# Patient Record
Sex: FEMALE | Race: WHITE | NOT HISPANIC OR LATINO | Employment: UNEMPLOYED | URBAN - METROPOLITAN AREA
[De-identification: names, ages, dates, MRNs, and addresses within clinical notes are randomized per-mention and may not be internally consistent; named-entity substitution may affect disease eponyms.]

---

## 2017-10-15 ENCOUNTER — HOSPITAL ENCOUNTER (EMERGENCY)
Facility: CLINIC | Age: 37
Discharge: HOME | End: 2017-10-15
Attending: EMERGENCY MEDICINE | Admitting: EMERGENCY MEDICINE
Payer: COMMERCIAL

## 2017-10-15 ENCOUNTER — APPOINTMENT (OUTPATIENT)
Dept: GENERAL RADIOLOGY | Facility: CLINIC | Age: 37
End: 2017-10-15
Attending: EMERGENCY MEDICINE
Payer: COMMERCIAL

## 2017-10-15 VITALS
HEART RATE: 78 BPM | BODY MASS INDEX: 24.91 KG/M2 | WEIGHT: 155 LBS | RESPIRATION RATE: 16 BRPM | TEMPERATURE: 97.7 F | DIASTOLIC BLOOD PRESSURE: 90 MMHG | HEIGHT: 66 IN | OXYGEN SATURATION: 98 % | SYSTOLIC BLOOD PRESSURE: 119 MMHG

## 2017-10-15 DIAGNOSIS — R09.A2 GLOBUS SENSATION: ICD-10-CM

## 2017-10-15 PROCEDURE — 25000125 ZZHC RX 250: Performed by: EMERGENCY MEDICINE

## 2017-10-15 PROCEDURE — 99284 EMERGENCY DEPT VISIT MOD MDM: CPT | Mod: 25 | Performed by: EMERGENCY MEDICINE

## 2017-10-15 PROCEDURE — 92511 NASOPHARYNGOSCOPY: CPT | Performed by: EMERGENCY MEDICINE

## 2017-10-15 PROCEDURE — 25000132 ZZH RX MED GY IP 250 OP 250 PS 637: Performed by: EMERGENCY MEDICINE

## 2017-10-15 PROCEDURE — 99285 EMERGENCY DEPT VISIT HI MDM: CPT | Mod: 25 | Performed by: EMERGENCY MEDICINE

## 2017-10-15 PROCEDURE — 70360 X-RAY EXAM OF NECK: CPT

## 2017-10-15 PROCEDURE — 92511 NASOPHARYNGOSCOPY: CPT | Mod: Z6 | Performed by: EMERGENCY MEDICINE

## 2017-10-15 PROCEDURE — 71020 XR CHEST 2 VW: CPT

## 2017-10-15 RX ORDER — LIDOCAINE HYDROCHLORIDE 40 MG/ML
50 SOLUTION TOPICAL ONCE
Status: COMPLETED | OUTPATIENT
Start: 2017-10-15 | End: 2017-10-15

## 2017-10-15 RX ORDER — OXYMETAZOLINE HYDROCHLORIDE 0.05 G/100ML
2 SPRAY NASAL 2 TIMES DAILY
Qty: 30 ML | Refills: 0 | Status: SHIPPED | OUTPATIENT
Start: 2017-10-15 | End: 2017-10-18

## 2017-10-15 RX ORDER — OXYMETAZOLINE HYDROCHLORIDE 0.05 G/100ML
2 SPRAY NASAL 2 TIMES DAILY
Status: DISCONTINUED | OUTPATIENT
Start: 2017-10-15 | End: 2017-10-15 | Stop reason: HOSPADM

## 2017-10-15 RX ADMIN — LIDOCAINE HYDROCHLORIDE 30 ML: 20 SOLUTION ORAL; TOPICAL at 08:32

## 2017-10-15 RX ADMIN — LIDOCAINE HYDROCHLORIDE 50 ML: 40 SOLUTION TOPICAL at 08:15

## 2017-10-15 RX ADMIN — OXYMETAZOLINE HYDROCHLORIDE 2 SPRAY: 5 SPRAY NASAL at 08:15

## 2017-10-15 ASSESSMENT — ENCOUNTER SYMPTOMS
COUGH: 0
SORE THROAT: 1
VOICE CHANGE: 0
TROUBLE SWALLOWING: 0

## 2017-10-15 NOTE — ED NOTES
"Pt ate some peanuts on Thursday, feel as if there is still one stuck in her throat.  Pt stated she \"always has problems when eating peanuts.\"  "

## 2017-10-15 NOTE — ED AVS SNAPSHOT
[facility], Emergency Department    [address]    [facility] [address]    Phone:  [phone]    Fax:  [phone]                                       Charity Duarte   MRN: [id number]    Department:  [facility], Emergency Department   Date of Visit:  10/15/2017           After Visit Summary Signature Page     I have received my discharge instructions, and my questions have been answered. I have discussed any challenges I see with this plan with the nurse or doctor.    ..........................................................................................................................................  Patient/Patient Representative Signature      ..........................................................................................................................................  Patient Representative Print Name and Relationship to Patient    ..................................................               ................................................  Date                                            Time    ..........................................................................................................................................  Reviewed by Signature/Title    ...................................................              ..............................................  Date                                                            Time

## 2017-10-15 NOTE — ED PROVIDER NOTES
"  History     Chief Complaint   Patient presents with     Swallowed Foreign Body     Pt feels as if she has  \"a peanut stuck in throat since Thursday night.\"     HPI  Charity Duarte is a 37 year old female who feels discomfort in her throat. 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 She states that previously she has had the sensation of a nut getting stuck in her throat, but has always passed it eventually. She has no voice changes, she is tolerating PO solids, liquids, and secretions, and no respiratory symptoms. She denies coughing or choking on the nut or food at the time.       This part of the document was transcribed by Ruby Galan Medical Scribe.   I have reviewed the Medications, Allergies, Past Medical and Surgical History, and Social History in the [facility] system.  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     ENT SURGERY  2009    wisdom tooth extraction       Family History   Problem Relation Age of Onset     DIABETES Maternal Grandmother      DIABETES Paternal Grandmother      Eye Disorder Maternal Grandmother      glaucoma     Family History Negative No family hx of      no breast, colon, uterine, ovarian     Hypertension Mother      DIABETES Mother      Psychotic Disorder Mother      Arthritis Mother      Arthritis Father      Hypertension Brother      DIABETES Brother        Social History   Substance Use Topics     Smoking status: Current Every Day Smoker     Packs/day: 1.00     Smokeless tobacco: Never Used      Comment: amount varies to stress     Alcohol use Yes      Comment: rare       Current Facility-Administered Medications   Medication     oxymetazoline (AFRIN) 0.05 % spray 2 " "spray     Current Outpatient Prescriptions   Medication     Sertraline HCl (ZOLOFT PO)     BACLOFEN PO     Citalopram Hydrobromide (CELEXA PO)     ranitidine (ZANTAC) 150 MG tablet     oxymetazoline (AFRIN) 0.05 % spray     norethindrone-ethinyl estradiol (MICROGESTIN 1.5/30) 1.5-30 MG-MCG TABS     norethindrone-ethinyl estradiol (MICROGESTIN 1/20) 1-20 MG-MCG per tablet     LORAZEPAM 1 MG OR TABS        Allergies   Allergen Reactions     Bees      Latex      Marijuana [Cannabis]      Pollen Extract        Review of Systems   HENT: Positive for sore throat. Negative for trouble swallowing and voice change.    Respiratory: Negative for cough.    All other systems reviewed and are negative.      Physical Exam   BP: 117/82  Pulse: 86  Heart Rate: 81  Temp: 97.8  F (36.6  C)  Resp: 16  Height: 167.6 cm (5' 6\")  Weight: 70.3 kg (155 lb)  SpO2: 98 %       Physical Exam  Gen:A&Ox3, no acute distress  HEENT:PERRL, no facial tenderness or wounds, head atraumatic. Oropharynx without tonsil enlargement or exudates, mucous membranes moist, TMs clear bilaterally  Neck:no bony tenderness, trachea midline, no masses, no crepitus.   CV:RRR without murmurs  PULM:Clear to auscultation bilaterally  Abd:soft, nontender, nondistended. Bowel sounds present and normal  UE:No traumatic injuries, skin normal  LE:no traumatic injuries, skin normal  Neuro:CN II-XII intact, strength 5/5 throughout, gait stable.         ED Course     ED Course     Procedures          Critical Care time:  none    Assessments & Plan (with Medical Decision Making)   37 year old female presenting with global sensation. She has stable vitals, work of breathing, respiratory rate, and speech all within normal limits and she is tolerating secretions without difficulty. X-rays of the chest and soft tissues of the neck were performed and showed no abnormalities to the epiglottis, soft tissues of the neck, or radiopaque foreign bodies. I attempted to perform a " nasopharyngoscopy after anesthesia with topical 4% lidocaine to the nares, oxymetazoline, and hurricane spray to the throat. I was able to visualized the upper oropharynx and the epiglottis, were all appearing normal, but she did not tolerate deeper evaluation to see the retinoid cords. I will have her follow up in the [facility] this week for further evaluation. She was given a GI cocktail to help decrease throat discomfort symptoms and we will have her use ranitidine BID for 10 days because she has extensive sinus drip and use Afrin nasal spray.     I have reviewed the nursing notes.    I have reviewed the findings, diagnosis, plan and need for follow up with the patient.  This part of the document was transcribed by Genet Arredondo Scribe.   Discharge Medication List as of 10/15/2017  8:54 AM      START taking these medications    Details   ranitidine (ZANTAC) 150 MG tablet Take 1 tablet (150 mg) by mouth 2 times daily for 10 days, Disp-20 tablet, R-0, Local Print      oxymetazoline (AFRIN) 0.05 % spray Spray 2 sprays into both nostrils 2 times daily for 3 days, Disp-30 mL, R-0, Local Print             Final diagnoses:   Globus sensation       10/15/2017   [facility], [place], EMERGENCY DEPARTMENT    MD FELY Mak Katrina Anne, MD  10/18/17 [id number]

## 2017-10-15 NOTE — DISCHARGE INSTRUCTIONS
Thank you for coming to the [facility] Emergency Department.       Please call to schedule an appointment this week in the [facility] ENT clinic for further evaluation of your throat pain. This clinic is located across the street from the [facility].   [address] [address]  [phone] toll-free  [phone]  Take Ranitidine 150mg twice daily to decrease throat discomfort related to excess stomach acid.   Use the Oxymetolazine nasal spray twice daily for 3 days to decrease sinus congestion and post-nasal drip that can cause throat irritation.     Eat a soft diet until you are seen in the ENT clinic for evaluation.

## 2017-10-15 NOTE — ED AVS SNAPSHOT
[facility], Emergency Department    [address]    [facility] [address]    Phone:  [phone]    Fax:  [phone]                                       Charity Duarte   MRN: [id number]    Department:  [facility], Emergency Department   Date of Visit:  10/15/2017           Patient Information     Date Of Birth          1980        Your diagnoses for this visit were:     Globus sensation        You were seen by Mikki Cordova MD.        Discharge Instructions       Thank you for coming to the [facility] Emergency Department.       Please call to schedule an appointment this week in the [facility] ENT clinic for further evaluation of your throat pain. This clinic is located across the street from the [facility].   [address]  [place] [address]  [phone] toll-free  [phone]  Take Ranitidine 150mg twice daily to decrease throat discomfort related to excess stomach acid.   Use the Oxymetolazine nasal spray twice daily for 3 days to decrease sinus congestion and post-nasal drip that can cause throat irritation.     Eat a soft diet until you are seen in the ENT clinic for evaluation.     24 Hour Appointment Hotline       To make an appointment at any [facility] clinic, call [phone] ([phone]). If you don't have a family doctor or clinic, we will help you find one. [facility] clinics are conveniently located to serve the needs of you and your family.             Review of your medicines      START taking        Dose / Directions Last dose taken    oxymetazoline 0.05 % spray   Commonly known as:  AFRIN   Dose:  2 spray   Quantity:  30 mL        Spray 2 sprays into both nostrils 2 times daily for 3 days   Refills:  0        ranitidine 150 MG tablet   Commonly known as:  ZANTAC   Dose:  150 mg   Quantity:  20 tablet        Take 1 tablet (150 mg) by mouth 2 times daily for 10 days   Refills:  0           Our records show that you are taking the medicines listed below. If these are incorrect, please call your family doctor or clinic.        Dose / Directions Last dose taken    BACLOFEN PO        Refills:  0        CELEXA PO        Refills:  0        LORazepam 1 MG tablet   Commonly known as:  ATIVAN   Quantity:  60        1 TABLET TWICE DAILY   Refills:  0        * norethindrone-ethinyl estradiol 1-20 MG-MCG per tablet   Commonly known as:  MICROGESTIN 1/20   Dose:  1 tablet   Quantity:  3 Package        Take 1 tablet by mouth daily.   Refills:  0        * norethindrone-ethinyl estradiol 1.5-30 MG-MCG per tablet   Commonly known as:  MICROGESTIN 1.5/30   Dose:  1 tablet   Quantity:  3 Package        Take 1 tablet by mouth daily   Refills:  1        ZOLOFT PO        Take by mouth daily   Refills:  0        * Notice:  This list has 2 medication(s) that are the same as other medications prescribed for you. Read the directions carefully, and ask your doctor or other care provider to review them with you.            Prescriptions were sent or printed at these locations (2 Prescriptions)                   Other Prescriptions                Printed at Department/Unit printer (2 of 2)         ranitidine (ZANTAC) 150 MG tablet               oxymetazoline (AFRIN) 0.05 % spray                Procedures and tests performed during your visit     Chest XR,  PA & LAT    Neck soft tissue XR      Orders Needing Specimen Collection     None      Pending Results     No orders found from 10/13/2017 to 10/16/2017.            Pending Culture Results     No orders found from 10/13/2017 to 10/16/2017.            Pending Results Instructions     If you had any lab results that were not finalized at the time of your Discharge, you can call the ED Lab Result RN at [phone]. You will be contacted by this team for any positive Lab results or changes in treatment. The nurses are available 7 days a week from 10A to 6:30P.  You can  "leave a message 24 hours per day and they will return your call.        Thank you for choosing [facility]       Thank you for choosing [facility] for your care. Our goal is always to provide you with excellent care. Hearing back from our patients is one way we can continue to improve our services. Please take a few minutes to complete the written survey that you may receive in the mail after you visit with us. Thank you!        [facility] Information     [employer] lets you send messages to your doctor, view your test results, renew your prescriptions, schedule appointments and more. To sign up, go to [email or web address] . Click on \"Log in\" on the left side of the screen, which will take you to the Welcome page. Then click on \"Sign up Now\" on the right side of the page.     You will be asked to enter the access code listed below, as well as some personal information. Please follow the directions to create your username and password.     Your access code is: [email or web address]  Expires: 2018  8:54 AM     Your access code will  in 90 days. If you need help or a new code, please call your [facility] clinic or [phone].        Care EveryWhere ID     This is your Care EveryWhere ID. This could be used by other organizations to access your [facility] medical records  [id number]        Equal Access to Services     CAROL LAIRD : Maribel phipps Sovenancio, waaxda luqadaha, qaybta kaalmada adegiovaniyapaola, patricia irwin . So [facility] [phone].    ATENCIÓN: Si habla español, tiene a syed disposición servicios gratuitos de asistencia lingüística. Llame al [phone].    We comply with applicable federal civil rights laws and [place] laws. We do not discriminate on the basis of race, color, national origin, age, disability, sex, sexual orientation, or gender identity.            After Visit Summary       This is your record. Keep this with you and show to your community pharmacist(s) and doctor(s) at " your next visit.

## 2018-03-27 ENCOUNTER — OFFICE VISIT (OUTPATIENT)
Dept: FAMILY MEDICINE | Facility: CLINIC | Age: 38
End: 2018-03-27
Payer: COMMERCIAL

## 2018-03-27 VITALS
WEIGHT: 163.8 LBS | RESPIRATION RATE: 18 BRPM | TEMPERATURE: 98.5 F | HEIGHT: 66 IN | BODY MASS INDEX: 26.33 KG/M2 | SYSTOLIC BLOOD PRESSURE: 120 MMHG | OXYGEN SATURATION: 98 % | DIASTOLIC BLOOD PRESSURE: 86 MMHG | HEART RATE: 87 BPM

## 2018-03-27 DIAGNOSIS — J06.9 VIRAL URI: Primary | ICD-10-CM

## 2018-03-27 PROCEDURE — 99203 OFFICE O/P NEW LOW 30 MIN: CPT | Performed by: PHYSICIAN ASSISTANT

## 2018-03-27 NOTE — PROGRESS NOTES
SUBJECTIVE:   Charity Duarte is a 37 year old female who presents to clinic today for the following health issues:    RESPIRATORY SYMPTOMS      Duration: 1 week     Description  nasal congestion, facial pain/pressure and cough    Severity: moderate    Accompanying signs and symptoms: Mucus production     History (predisposing factors):  none    Precipitating or alleviating factors: None    Therapies tried and outcome:  OTC sinus/congestion     -Patient is a 38yo female presenting to clinic with around 1-2 weeks of sinus symptoms  -she notes that this follows deep cleaning her son's room and noting a smell from there  -her symptoms began with nasal congestion, feels like a head cold  -then she began smelling chlorine like symptoms  -coughing and blowing out dark green phlegm  -there is no tooth pain  -does have some frontal headache  -no fevers, though has felt some chills  -there is no shortness of breath with the cough  -has tried some otc for chest/sinus congestion - mildly helpful    Problem list and histories reviewed & adjusted, as indicated.  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     ENT SURGERY  2009    wisdom tooth extraction       Social History   Substance Use Topics     Smoking status: Current Every Day Smoker     Packs/day: 1.00     Smokeless tobacco: Never Used      Comment: amount varies to stress     Alcohol use Yes      Comment: rare     Family History   Problem Relation Age of Onset     Hypertension Mother      DIABETES Mother      Psychotic Disorder Mother      Arthritis Mother      Arthritis Father      DIABETES Maternal Grandmother      Eye Disorder Maternal Grandmother      glaucoma     DIABETES Paternal Grandmother      Hypertension Brother      DIABETES Brother      Family History Negative  "No family hx of      no breast, colon, uterine, ovarian           Reviewed and updated as needed this visit by clinical staff       Reviewed and updated as needed this visit by Provider         ROS:  Constitutional, HEENT, cardiovascular, pulmonary, gi and gu systems are negative, except as otherwise noted.    OBJECTIVE:     /86  Pulse 87  Temp 98.5  F (36.9  C) (Tympanic)  Resp 18  Ht 5' 6\" (1.676 m)  Wt 163 lb 12.8 oz (74.3 kg)  SpO2 98%  BMI 26.44 kg/m2  Body mass index is 26.44 kg/(m^2).  GENERAL: healthy, alert and no distress  EYES: Eyes grossly normal to inspection, PERRL and conjunctivae and sclerae normal  HENT: normal cephalic/atraumatic, ear canals and TM's normal, nasal mucosa edematous , rhinorrhea thick, oropharynx clear and sinuses: not tender  NECK: no adenopathy  RESP: lungs clear to auscultation - no rales, rhonchi or wheezes  CV: regular rates and rhythm, normal S1 S2, no S3 or S4 and no murmur, click or rub    Diagnostic Test Results:  none     ASSESSMENT/PLAN:   1. Viral URI  I think she can treat this with otc for a while longer. There were no red flags to the exam or vitals. She does smoke but lungs are clear. If symptoms arent improving by Friday, ok to call for antibiotic (likely augmentin).    Jaime Sanchez PA-C  [facility]  "

## 2018-03-27 NOTE — MR AVS SNAPSHOT
"              After Visit Summary   3/27/2018    Charity Duarte    MRN: [id number]           Patient Information     Date Of Birth          1980        Visit Information        Provider Department      3/27/2018 4:20 PM Jaime Sanchez PA-C [facility] Emergency Department        Today's Diagnoses     Viral URI    -  1       Follow-ups after your visit        Follow-up notes from your care team     Return in about 3 days (around 3/30/2018) for Just call on Friday of over the counter medications are not helping.      Who to contact     If you have questions or need follow up information about today's clinic visit or your schedule please contact [facility] directly at [phone].  Normal or non-critical lab and imaging results will be communicated to you by MyChart, letter or phone within 4 business days after the clinic has received the results. If you do not hear from us within 7 days, please contact the clinic through [facility] or phone. If you have a critical or abnormal lab result, we will notify you by phone as soon as possible.  Submit refill requests through [employer] or call your pharmacy and they will forward the refill request to us. Please allow 3 business days for your refill to be completed.          Additional Information About Your Visit        MyChart Information     [employer] lets you send messages to your doctor, view your test results, renew your prescriptions, schedule appointments and more. To sign up, go to [email or web address] . Click on \"Log in\" on the left side of the screen, which will take you to the Welcome page. Then click on \"Sign up Now\" on the right side of the page.     You will be asked to enter the access code listed below, as well as some personal information. Please follow the directions to create your username and password.     Your access code is: [email or web address]  Expires: 2018  4:38 PM     Your access code will  in 90 days. If you need help or a new " "code, please call your [facility] clinic or [phone].        Care EveryWhere ID     This is your Care EveryWhere ID. This could be used by other organizations to access your [facility] medical records  [id number]        Your Vitals Were     Pulse Temperature Respirations Height Pulse Oximetry BMI (Body Mass Index)    87 98.5  F (36.9  C) (Tympanic) 18 5' 6\" (1.676 m) 98% 26.44 kg/m2       Blood Pressure from Last 3 Encounters:   03/27/18 120/86   10/15/17 119/90   09/17/13 112/64    Weight from Last 3 Encounters:   03/27/18 163 lb 12.8 oz (74.3 kg)   10/15/17 155 lb (70.3 kg)   09/17/13 144 lb 2.9 oz (65.4 kg)              Today, you had the following     No orders found for display       Primary Care Provider Fax #    Physician No Ref-Primary [phone]       No address on file        Equal Access to Services     CAROL LAIRD : Hadii aad ku hadasho Soomaali, waaxda luqadaha, qaybta kaalmada adeegyada, waxay anushain thea irwin . So [facility] [phone].    ATENCIÓN: Si habla español, tiene a syed disposición servicios gratuitos de asistencia lingüística. Llame al [phone].    We comply with applicable federal civil rights laws and [place] laws. We do not discriminate on the basis of race, color, national origin, age, disability, sex, sexual orientation, or gender identity.            Thank you!     Thank you for choosing [facility]  for your care. Our goal is always to provide you with excellent care. Hearing back from our patients is one way we can continue to improve our services. Please take a few minutes to complete the written survey that you may receive in the mail after your visit with us. Thank you!             Your Updated Medication List - Protect others around you: Learn how to safely use, store and throw away your medicines at www.disposemymeds.org.          This list is accurate as of 3/27/18  4:38 PM.  Always use your most recent med list.                   Brand " Name Dispense Instructions for use Diagnosis    BACLOFEN PO           CELEXA PO           LORazepam 1 MG tablet    ATIVAN    60    1 TABLET TWICE DAILY        * norethindrone-ethinyl estradiol 1-20 MG-MCG per tablet    MICROGESTIN 1/20    3 Package    Take 1 tablet by mouth daily.    Irregular bleeding, History of ovarian cyst, Dysmenorrhea       * norethindrone-ethinyl estradiol 1.5-30 MG-MCG per tablet    MICROGESTIN 1.5/30    3 Package    Take 1 tablet by mouth daily    Irregular bleeding       ZOLOFT PO      Take by mouth daily        * Notice:  This list has 2 medication(s) that are the same as other medications prescribed for you. Read the directions carefully, and ask your doctor or other care provider to review them with you.

## 2018-07-17 ENCOUNTER — TELEPHONE (OUTPATIENT)
Dept: FAMILY MEDICINE | Facility: CLINIC | Age: 38
End: 2018-07-17

## 2018-08-24 ENCOUNTER — OFFICE VISIT (OUTPATIENT)
Dept: FAMILY MEDICINE | Facility: CLINIC | Age: 38
End: 2018-08-24
Payer: COMMERCIAL

## 2018-08-24 VITALS
WEIGHT: 155.6 LBS | RESPIRATION RATE: 16 BRPM | HEART RATE: 85 BPM | SYSTOLIC BLOOD PRESSURE: 120 MMHG | DIASTOLIC BLOOD PRESSURE: 78 MMHG | HEIGHT: 65 IN | BODY MASS INDEX: 25.92 KG/M2 | OXYGEN SATURATION: 98 % | TEMPERATURE: 97.1 F

## 2018-08-24 DIAGNOSIS — M54.10 RADICULOPATHY OF ARM: ICD-10-CM

## 2018-08-24 DIAGNOSIS — M54.2 CERVICALGIA: Primary | ICD-10-CM

## 2018-08-24 DIAGNOSIS — R20.2 NUMBNESS AND TINGLING: ICD-10-CM

## 2018-08-24 DIAGNOSIS — R20.0 NUMBNESS AND TINGLING: ICD-10-CM

## 2018-08-24 DIAGNOSIS — R42 DIZZINESS: ICD-10-CM

## 2018-08-24 LAB
BASOPHILS # BLD AUTO: 0 10E9/L (ref 0–0.2)
BASOPHILS NFR BLD AUTO: 0.3 %
DIFFERENTIAL METHOD BLD: NORMAL
EOSINOPHIL # BLD AUTO: 0.2 10E9/L (ref 0–0.7)
EOSINOPHIL NFR BLD AUTO: 2.4 %
ERYTHROCYTE [DISTWIDTH] IN BLOOD BY AUTOMATED COUNT: 13.7 % (ref 10–15)
FOLATE SERPL-MCNC: 28.7 NG/ML
HCT VFR BLD AUTO: 45.4 % (ref 35–47)
HGB BLD-MCNC: 15.5 G/DL (ref 11.7–15.7)
LYMPHOCYTES # BLD AUTO: 2.2 10E9/L (ref 0.8–5.3)
LYMPHOCYTES NFR BLD AUTO: 24.6 %
MCH RBC QN AUTO: 30.3 PG (ref 26.5–33)
MCHC RBC AUTO-ENTMCNC: 34.1 G/DL (ref 31.5–36.5)
MCV RBC AUTO: 89 FL (ref 78–100)
MONOCYTES # BLD AUTO: 0.8 10E9/L (ref 0–1.3)
MONOCYTES NFR BLD AUTO: 8.4 %
NEUTROPHILS # BLD AUTO: 5.8 10E9/L (ref 1.6–8.3)
NEUTROPHILS NFR BLD AUTO: 64.3 %
PLATELET # BLD AUTO: 232 10E9/L (ref 150–450)
RBC # BLD AUTO: 5.11 10E12/L (ref 3.8–5.2)
VIT B12 SERPL-MCNC: 933 PG/ML (ref 193–986)
WBC # BLD AUTO: 9 10E9/L (ref 4–11)

## 2018-08-24 PROCEDURE — 99213 OFFICE O/P EST LOW 20 MIN: CPT | Performed by: PHYSICIAN ASSISTANT

## 2018-08-24 PROCEDURE — 82607 VITAMIN B-12: CPT | Performed by: PHYSICIAN ASSISTANT

## 2018-08-24 PROCEDURE — 82746 ASSAY OF FOLIC ACID SERUM: CPT | Performed by: PHYSICIAN ASSISTANT

## 2018-08-24 PROCEDURE — 36415 COLL VENOUS BLD VENIPUNCTURE: CPT | Performed by: PHYSICIAN ASSISTANT

## 2018-08-24 PROCEDURE — 80053 COMPREHEN METABOLIC PANEL: CPT | Performed by: PHYSICIAN ASSISTANT

## 2018-08-24 PROCEDURE — 84443 ASSAY THYROID STIM HORMONE: CPT | Performed by: PHYSICIAN ASSISTANT

## 2018-08-24 PROCEDURE — 85025 COMPLETE CBC W/AUTO DIFF WBC: CPT | Performed by: PHYSICIAN ASSISTANT

## 2018-08-24 RX ORDER — MECLIZINE HYDROCHLORIDE 25 MG/1
25 TABLET ORAL EVERY 6 HOURS PRN
Qty: 30 TABLET | Refills: 0 | Status: SHIPPED | OUTPATIENT
Start: 2018-08-24 | End: 2018-12-10

## 2018-08-24 RX ORDER — CYCLOBENZAPRINE HCL 10 MG
10 TABLET ORAL
Qty: 14 TABLET | Refills: 0 | Status: SHIPPED | OUTPATIENT
Start: 2018-08-24 | End: 2018-12-10

## 2018-08-24 NOTE — MR AVS SNAPSHOT
After Visit Summary   8/24/2018    Charity Duarte    MRN: [id number]           Patient Information     Date Of Birth          1980        Visit Information        Provider Department      8/24/2018 11:00 AM Jaime Sanchez PA-C [facility]        Today's Diagnoses     Cervicalgia    -  1    Radiculopathy of arm        Dizziness        Numbness and tingling          Care Instructions    I am unsure the cause of all of your symptoms   You may have some vertigo - try the meclizine for that  A muscle relaxant might help the neck symptoms and the need to stretch  Finally, let's have you schedule with the spine specialist and the dizzy/balance center          Follow-ups after your visit        Additional Services     ORTHO  REFERRAL       [facility] is referring you to the Orthopedic  Services at [facility].       The  Representative will assist you in the coordination of your Orthopedic and Musculoskeletal Care as prescribed by your physician.    The  Representative will call you within 1 business day to help schedule your appointment, or you may contact the  Representative at:    All areas ~ [phone]     Type of Referral : Spine: Cervical / Thoracic: Medical Spine Specialist        Timeframe requested: Routine    Coverage of these services is subject to the terms and limitations of your health insurance plan.  Please call member services at your health plan with any benefit or coverage questions.      If X-rays, CT or MRI's have been performed, please contact the facility where they were done to arrange for , prior to your scheduled appointment.  Please bring this referral request to your appointment and present it to your specialist.            PHYSICAL THERAPY REFERRAL       *This therapy referral will be filtered to a centralized scheduling office at [facility]  "and the patient will receive a call to schedule an appointment at a [facility] location most convenient for them. *     [facility] provides Physical Therapy evaluation and treatment and many specialty services across the [facility] system.  If requesting a specialty program, please choose from the list below.    If you have not heard from the scheduling office within 2 business days, please call [phone] for all locations, with the exception of [place], please call [phone] and [facility], please call [phone]  Treatment: Evaluation & Treatment  Special Instructions/Modalities: none  Special Programs: Balance/Vestibular    Please be aware that coverage of these services is subject to the terms and limitations of your health insurance plan.  Call member services at your health plan with any benefit or coverage questions.      **Note to Provider:  If you are referring outside of [facility] for the therapy appointment, please list the name of the location in the \"special instructions\" above, print the referral and give to the patient to schedule the appointment.                  Who to contact     If you have questions or need follow up information about today's clinic visit or your schedule please contact [facility] directly at [phone].  Normal or non-critical lab and imaging results will be communicated to you by MyChart, letter or phone within 4 business days after the clinic has received the results. If you do not hear from us within 7 days, please contact the clinic through MyChart or phone. If you have a critical or abnormal lab result, we will notify you by phone as soon as possible.  Submit refill requests through [employer] or call your pharmacy and they will forward the refill request to us. Please allow 3 business days for your refill to be completed.          Additional Information About Your Visit        Care EveryWhere ID     This is your Care EveryWhere ID. " "This could be used by other organizations to access your [place] medical records  [id number]        Your Vitals Were     Pulse Temperature Respirations Height Pulse Oximetry BMI (Body Mass Index)    85 97.1  F (36.2  C) (Tympanic) 16 5' 5\" (1.651 m) 98% 25.89 kg/m2       Blood Pressure from Last 3 Encounters:   08/24/18 120/78   03/27/18 120/86   10/15/17 119/90    Weight from Last 3 Encounters:   08/24/18 155 lb 9.6 oz (70.6 kg)   03/27/18 163 lb 12.8 oz (74.3 kg)   10/15/17 155 lb (70.3 kg)              We Performed the Following     CBC with platelets differential     Comprehensive metabolic panel     Folate     ORTHO  REFERRAL     PHYSICAL THERAPY REFERRAL     TSH with free T4 reflex     Vitamin B12          Today's Medication Changes          These changes are accurate as of 8/24/18 11:48 AM.  If you have any questions, ask your nurse or doctor.               Start taking these medicines.        Dose/Directions    cyclobenzaprine 10 MG tablet   Commonly known as:  FLEXERIL   Used for:  Cervicalgia   Started by:  Jaime Sanchez PA-C        Dose:  10 mg   Take 1 tablet (10 mg) by mouth nightly as needed   Quantity:  14 tablet   Refills:  0       meclizine 25 MG tablet   Commonly known as:  ANTIVERT   Used for:  Dizziness   Started by:  Jaime Sanchez PA-C        Dose:  25 mg   Take 1 tablet (25 mg) by mouth every 6 hours as needed for dizziness   Quantity:  30 tablet   Refills:  0            Where to get your medicines      These medications were sent to [id number] [address] [address]     Phone:  [phone]     cyclobenzaprine 10 MG tablet    meclizine 25 MG tablet                Primary Care Provider Fax #    Physician No Ref-Primary [phone]       No address on file        Equal Access to Services     CAROL LAIRD AH: Maribel Abdul, waaxda luqadaha, qaybta kaalmada adeegyada, patricia sidhu " isaias garciamariamadella banguraTomaszaabeatriz ah. So [facility] [phone].    ATENCIÓN: Si habla joie, tiene a syed disposición servicios gratuitos de asistencia lingüística. Dima al [phone].    We comply with applicable federal civil rights laws and [place] laws. We do not discriminate on the basis of race, color, national origin, age, disability, sex, sexual orientation, or gender identity.            Thank you!     Thank you for choosing [facility]3] [facility]  for your care. Our goal is always to provide you with excellent care. Hearing back from our patients is one way we can continue to improve our services. Please take a few minutes to complete the written survey that you may receive in the mail after your visit with us. Thank you!             Your Updated Medication List - Protect others around you: Learn how to safely use, store and throw away your medicines at www.disposemymeds.org.          This list is accurate as of 8/24/18 11:48 AM.  Always use your most recent med list.                   Brand Name Dispense Instructions for use Diagnosis    ALEVE PO           ALLERGY PO           cyclobenzaprine 10 MG tablet    FLEXERIL    14 tablet    Take 1 tablet (10 mg) by mouth nightly as needed    Cervicalgia       IBUPROFEN PO           meclizine 25 MG tablet    ANTIVERT    30 tablet    Take 1 tablet (25 mg) by mouth every 6 hours as needed for dizziness    Dizziness       TYLENOL PO
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
## 2018-08-24 NOTE — PATIENT INSTRUCTIONS
I am unsure the cause of all of your symptoms   You may have some vertigo - try the meclizine for that  A muscle relaxant might help the neck symptoms and the need to stretch  Finally, let's have you schedule with the spine specialist and the dizzy/balance center

## 2018-08-24 NOTE — LETTER
[facility]  [address] [place] [address]                  [phone]   August 27, 2018    Charity Duarte  [address] [place] [id number]      Dear Charity,     The lab work is back and fortunately it all looks healthy.   There is no indication for the reason you've been experiencing increased symptoms.   Very likely it is musculoskeletal and will likely need trips to the specialist.   Let me know if you have any questions about these.     Shon Sanchez PA-C         Results for orders placed or performed in visit on 08/24/18   TSH with free T4 reflex   Result Value Ref Range    TSH 1.02 0.40 - 4.00 mU/L   CBC with platelets differential   Result Value Ref Range    WBC 9.0 4.0 - 11.0 10e9/L    RBC Count 5.11 3.8 - 5.2 10e12/L    Hemoglobin 15.5 11.7 - 15.7 g/dL    Hematocrit 45.4 35.0 - 47.0 %    MCV 89 78 - 100 fl    MCH 30.3 26.5 - 33.0 pg    MCHC 34.1 31.5 - 36.5 g/dL    RDW 13.7 10.0 - 15.0 %    Platelet Count 232 150 - 450 10e9/L    Diff Method Automated Method     % Neutrophils 64.3 %    % Lymphocytes 24.6 %    % Monocytes 8.4 %    % Eosinophils 2.4 %    % Basophils 0.3 %    Absolute Neutrophil 5.8 1.6 - 8.3 10e9/L    Absolute Lymphocytes 2.2 0.8 - 5.3 10e9/L    Absolute Monocytes 0.8 0.0 - 1.3 10e9/L    Absolute Eosinophils 0.2 0.0 - 0.7 10e9/L    Absolute Basophils 0.0 0.0 - 0.2 10e9/L   Comprehensive metabolic panel   Result Value Ref Range    Sodium 140 133 - 144 mmol/L    Potassium 4.1 3.4 - 5.3 mmol/L    Chloride 107 94 - 109 mmol/L    Carbon Dioxide 24 20 - 32 mmol/L    Anion Gap 9 3 - 14 mmol/L    Glucose 97 70 - 99 mg/dL    Urea Nitrogen 12 7 - 30 mg/dL    Creatinine 0.56 0.52 - 1.04 mg/dL    GFR Estimate >90 >60 mL/min/1.7m2    GFR Estimate If Black >90 >60 mL/min/1.7m2    Calcium 8.6 8.5 - 10.1 mg/dL    Bilirubin Total 0.3 0.2 - 1.3 mg/dL    Albumin 4.1 3.4 - 5.0 g/dL    Protein Total 7.5 6.8 - 8.8 g/dL    Alkaline Phosphatase 78 40 - 150 U/L    ALT 18 0  - 50 U/L    AST 14 0 - 45 U/L   Vitamin B12   Result Value Ref Range    Vitamin B12 933 193 - 986 pg/mL   Folate   Result Value Ref Range    Folate 28.7 >5.4 ng/mL

## 2018-08-25 LAB
ALBUMIN SERPL-MCNC: 4.1 G/DL (ref 3.4–5)
ALP SERPL-CCNC: 78 U/L (ref 40–150)
ALT SERPL W P-5'-P-CCNC: 18 U/L (ref 0–50)
ANION GAP SERPL CALCULATED.3IONS-SCNC: 9 MMOL/L (ref 3–14)
AST SERPL W P-5'-P-CCNC: 14 U/L (ref 0–45)
BILIRUB SERPL-MCNC: 0.3 MG/DL (ref 0.2–1.3)
BUN SERPL-MCNC: 12 MG/DL (ref 7–30)
CALCIUM SERPL-MCNC: 8.6 MG/DL (ref 8.5–10.1)
CHLORIDE SERPL-SCNC: 107 MMOL/L (ref 94–109)
CO2 SERPL-SCNC: 24 MMOL/L (ref 20–32)
CREAT SERPL-MCNC: 0.56 MG/DL (ref 0.52–1.04)
GFR SERPL CREATININE-BSD FRML MDRD: >90 ML/MIN/1.7M2
GLUCOSE SERPL-MCNC: 97 MG/DL (ref 70–99)
POTASSIUM SERPL-SCNC: 4.1 MMOL/L (ref 3.4–5.3)
PROT SERPL-MCNC: 7.5 G/DL (ref 6.8–8.8)
SODIUM SERPL-SCNC: 140 MMOL/L (ref 133–144)
TSH SERPL DL<=0.005 MIU/L-ACNC: 1.02 MU/L (ref 0.4–4)

## 2018-09-06 NOTE — TELEPHONE ENCOUNTER
FUTURE VISIT INFORMATION      FUTURE VISIT INFORMATION:    Date: 9/07    Time: 11:00    Location:   REFERRAL INFORMATION:    Referring provider:  Jaime Sanchez    Referring providers clinic:   [facility]    Reason for visit/diagnosis  Cervicalgia, Radiculopathy of arm        RECORDS STATUS      ALL RECORDS INTERNAL

## 2018-09-07 ENCOUNTER — OFFICE VISIT (OUTPATIENT)
Dept: ORTHOPEDICS | Facility: CLINIC | Age: 38
End: 2018-09-07
Payer: COMMERCIAL

## 2018-09-07 ENCOUNTER — PRE VISIT (OUTPATIENT)
Dept: ORTHOPEDICS | Facility: CLINIC | Age: 38
End: 2018-09-07

## 2018-09-07 DIAGNOSIS — R42 DIZZINESS: ICD-10-CM

## 2018-09-07 DIAGNOSIS — M54.2 CERVICALGIA: Primary | ICD-10-CM

## 2018-09-07 NOTE — MR AVS SNAPSHOT
After Visit Summary   9/7/2018    Charity Duarte    MRN: [id number]           Patient Information     Date Of Birth          1980        Visit Information        Provider Department      9/7/2018 11:00 AM Murali Peterson MD [facility]        Today's Diagnoses     Cervicalgia    -  1    Dizziness           Follow-ups after your visit        Your next 10 appointments already scheduled     Sep 21, 2018  8:30 AM CDT   MR CERVICAL SPINE W/O CONTRAST with [id number]   [facility] ([facility])    [address] [address] [phone]           Take your medicines as usual, unless your doctor tells you not to. Bring a list of your current medicines to your exam (including vitamins, minerals and over-the-counter drugs). Also bring the results of similar scans you may have had.  Please remove any body piercings and hair extensions before you arrive.  Follow your doctor s orders. If you do not, we may have to postpone your exam.  You may or may not receive IV contrast for this exam pending the discretion of the Radiologist.  You do not need to do anything special to prepare.  The MRI machine uses a strong magnet. Please wear clothes without metal (snaps, zippers). A sweatsuit works well, or we may give you a hospital gown.   **IMPORTANT** THE INSTRUCTIONS BELOW ARE ONLY FOR THOSE PATIENTS WHO HAVE BEEN PRESCRIBED SEDATION OR GENERAL ANESTHESIA DURING THEIR MRI PROCEDURE:  IF YOUR DOCTOR PRESCRIBED ORAL SEDATION (take medicine to help you relax during your exam):   You must get the medicine from your doctor (oral medication) before you arrive. Bring the medicine to the exam. Do not take it at home. You ll be told when to take it upon arriving for your exam.   Arrive one hour early. Bring someone who can take you home after the test. Your medicine will make you sleepy. After the exam, you may not drive, take a bus or  take a taxi by yourself.  IF YOUR DOCTOR PRESCRIBED IV SEDATION:   Arrive one hour early. Bring someone who can take you home after the test. Your medicine will make you sleepy. After the exam, you may not drive, take a bus or take a taxi by yourself.   No eating 6 hours before your exam. You may have clear liquids up until 4 hours before your exam. (Clear liquids include water, clear tea, black coffee and fruit juice without pulp.)  IF YOUR DOCTOR PRESCRIBED ANESTHESIA (be asleep for your exam):   Arrive 1 1/2 hours early. Bring someone who can take you home after the test. You may not drive, take a bus or take a taxi by yourself.   No eating 8 hours before your exam. You may have clear liquids up until 4 hours before your exam. (Clear liquids include water, clear tea, black coffee and fruit juice without pulp.)   You will spend four to five hours in the recovery room.  Please call the Imaging Department at your exam site with any questions.            Sep 28, 2018 10:30 AM CDT   (Arrive by 10:15 AM)   Return Visit with Murali Peterson MD   [facility] ([facility])    [address] [address] [phone]              Future tests that were ordered for you today     Open Future Orders        Priority Expected Expires Ordered    MRI Cervical spine w/o contrast Routine  9/7/2019 9/7/2018            Who to contact     Please call your clinic at [phone] to:    Ask questions about your health    Make or cancel appointments    Discuss your medicines    Learn about your test results    Speak to your doctor            Additional Information About Your Visit        Care EveryWhere ID     This is your Care EveryWhere ID. This could be used by other organizations to access your [place] medical records  [id number]         Blood Pressure from Last 3 Encounters:   08/24/18 120/78   03/27/18 120/86   10/15/17 119/90    Weight from Last 3  Encounters:   08/24/18 155 lb 9.6 oz (70.6 kg)   03/27/18 163 lb 12.8 oz (74.3 kg)   10/15/17 155 lb (70.3 kg)               Primary Care Provider Fax #    Physician No Ref-Primary [phone]       No address on file        Equal Access to Services     CAROL NOVAKLYNNE : Hadii aad ku hadasho Soomaali, waaxda luqadaha, qaybta kaalmada adeegyada, waxlucy hermilo brentbeatriz esparza caradella irwin . So [facility] [phone].    ATENCIÓN: Si habla español, tiene a syed disposición servicios gratuitos de asistencia lingüística. Llame al [phone].    We comply with applicable federal civil rights laws and [place] laws. We do not discriminate on the basis of race, color, national origin, age, disability, sex, sexual orientation, or gender identity.            Thank you!     Thank you for choosing [facility]  for your care. Our goal is always to provide you with excellent care. Hearing back from our patients is one way we can continue to improve our services. Please take a few minutes to complete the written survey that you may receive in the mail after your visit with us. Thank you!             Your Updated Medication List - Protect others around you: Learn how to safely use, store and throw away your medicines at www.disposemymeds.org.          This list is accurate as of 9/7/18 11:59 AM.  Always use your most recent med list.                   Brand Name Dispense Instructions for use Diagnosis    ALEVE PO           ALLERGY PO           cyclobenzaprine 10 MG tablet    FLEXERIL    14 tablet    Take 1 tablet (10 mg) by mouth nightly as needed    Cervicalgia       IBUPROFEN PO           meclizine 25 MG tablet    ANTIVERT    30 tablet    Take 1 tablet (25 mg) by mouth every 6 hours as needed for dizziness    Dizziness       TYLENOL PO
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
## 2018-09-21 ENCOUNTER — RADIANT APPOINTMENT (OUTPATIENT)
Dept: MRI IMAGING | Facility: CLINIC | Age: 38
End: 2018-09-21
Attending: FAMILY MEDICINE
Payer: COMMERCIAL

## 2018-09-21 DIAGNOSIS — M54.2 CERVICALGIA: ICD-10-CM

## 2018-09-21 DIAGNOSIS — R42 DIZZINESS: ICD-10-CM

## 2018-09-28 ENCOUNTER — OFFICE VISIT (OUTPATIENT)
Dept: ORTHOPEDICS | Facility: CLINIC | Age: 38
End: 2018-09-28
Payer: COMMERCIAL

## 2018-09-28 VITALS — RESPIRATION RATE: 16 BRPM | HEIGHT: 65 IN | BODY MASS INDEX: 25.83 KG/M2 | WEIGHT: 155 LBS

## 2018-09-28 DIAGNOSIS — M79.18 MYOFASCIAL PAIN SYNDROME, CERVICAL: Primary | ICD-10-CM

## 2018-09-28 NOTE — LETTER
9/28/2018      RE: Charity Duarte  [address] [place] [id number]        Subjective:   Charity Duarte is a 38 year old female who is f/u for her cervical MRI.     We have reviewed her cervical MRI.  She has no significant findings to suggest an underlying abnormality.  This is consistent with my belief that this is most likely myofascial.  I have recommended that she get started with physical therapy and have completed an order for that.  She will need at least 2 months of physical therapy and then she can see our chronic pain clinic and they may consider trigger point injections or other cervical interventions that may provide some relief but that would be in a more appropriate setting for her ongoing care.      All questions were answered and we assisted her with making an appointment for the chronic pain clinic in approximately 2 months.         Murali Peterson MD

## 2018-09-28 NOTE — MR AVS SNAPSHOT
After Visit Summary   9/28/2018    Charity Duarte    MRN: [id number]           Patient Information     Date Of Birth          1980        Visit Information        Provider Department      9/28/2018 10:30 AM Murali Peterson MD [facility]        Today's Diagnoses     Myofascial pain syndrome, cervical    -  1       Follow-ups after your visit        Additional Services     [facility]       Your provider has referred you to: [facility], located on the 4th Floor of the [facility]. Please call [phone] to make an appointment.     Clinic is located at:   [facility] [place] [address]      Please complete the following questions:    Procedure/Referral: Referral Only -  Comprehensive Evaluation and Management    What is your diagnosis for the patient's pain? Myofascial pain syndrome    What are your specific questions for the pain specialist?     Are there any red flags that may impact the assessment or management of the patient? None    REGARDING OPIOID MEDICATIONS:  The discussion of opioids management, appropriateness of therapy, and dosing will be discussed in patients being seen for evaluation.  The pain management clinics are not long-term prescribing clinics, with transition of prescribing of medications ultimately going back to the referring provider/PCP.  If prescribing is taken over at the pain clinic, it is in actively involved patients whom are appropriate for opioids, urine drug screening is completed, and long-term prescribing plan has been determined.  Therefore, we will not be automatically taking over prescribing at the patient's first visit.  Is this agreeable to you? agrees.    Please be aware that coverage of these services is subject to the terms and limitations of your health insurance plan.  Call member services at your health plan  with any benefit or coverage questions.      Please bring the following with you to your appointment or have sent to the [facility]:    (1) Any X-Rays, CTs or MRIs which have been performed that are not in Epic.  Contact the facility where they were done to arrange for  prior to your scheduled appointment.  Any new CT, MRI or other procedures ordered by your specialist must be performed at a [facility] facility or coordinated by your clinic's referral office.    (2) List of current medications   (3) This referral request   (4) Any documents/labs given to you for this referral            PHYSICAL THERAPY REFERRAL (External-Prints)       Physical Therapy Referral                  Your next 10 appointments already scheduled     Oct 19, 2018 10:00 AM CDT   LAB with [facility] ([facility])    [facility] [id number] [phone]           Please do not eat 10-12 hours before your appointment if you are coming in fasting for labs on lipids, cholesterol, or glucose (sugar). This does not apply to pregnant women. Water, hot tea and black coffee (with nothing added) are okay. Do not drink other fluids, diet soda or chew gum.            Dec 07, 2018 10:20 AM CST   (Arrive by 10:05 AM)   New Patient Visit with JOHN Siu [facility] ([facility])    [address] [facility]  [address] [facility] [id number] [phone]              Future tests that were ordered for you today     Open Future Orders        Priority Expected Expires Ordered    PHYSICAL THERAPY REFERRAL (External-Prints) Routine  9/28/2019 9/28/2018            Who to contact     Please call your clinic at [phone] to:    Ask questions about your health    Make or cancel appointments    Discuss your medicines    Learn about your test results    Speak to your doctor             "Additional Information About Your Visit        Care EveryWhere ID     This is your Care EveryWhere ID. This could be used by other organizations to access your [facility] medical records  [id number]        Your Vitals Were     Respirations Height BMI (Body Mass Index)             16 5' 5\" (1.651 m) 25.79 kg/m2          Blood Pressure from Last 3 Encounters:   08/24/18 120/78   03/27/18 120/86   10/15/17 119/90    Weight from Last 3 Encounters:   09/28/18 155 lb (70.3 kg)   08/24/18 155 lb 9.6 oz (70.6 kg)   03/27/18 163 lb 12.8 oz (74.3 kg)              We Performed the Following     [facility] REFERRAL        Primary Care Provider Fax #    Physician No Ref-Primary [phone]       No address on file        Equal Access to Services     CAROL LAIRD : Maribel Abdul, wajihan frey, nasreen kaalmapaola roach, patricia irwin . So [facility] [phone].    ATENCIÓN: Si habla español, tiene a syed disposición servicios gratuitos de asistencia lingüística. Dima al [phone].    We comply with applicable federal civil rights laws and [place] laws. We do not discriminate on the basis of race, color, national origin, age, disability, sex, sexual orientation, or gender identity.            Thank you!     Thank you for choosing [facility]  for your care. Our goal is always to provide you with excellent care. Hearing back from our patients is one way we can continue to improve our services. Please take a few minutes to complete the written survey that you may receive in the mail after your visit with us. Thank you!             Your Updated Medication List - Protect others around you: Learn how to safely use, store and throw away your medicines at www.disposemymeds.org.          This list is accurate as of 9/28/18 10:49 AM.  Always use your most recent med list.                   Brand Name Dispense Instructions for use Diagnosis    ALEVE PO "           ALLERGY PO           cyclobenzaprine 10 MG tablet    FLEXERIL    14 tablet    Take 1 tablet (10 mg) by mouth nightly as needed    Cervicalgia       IBUPROFEN PO           meclizine 25 MG tablet    ANTIVERT    30 tablet    Take 1 tablet (25 mg) by mouth every 6 hours as needed for dizziness    Dizziness       TYLENOL PO

## 2018-09-28 NOTE — PROGRESS NOTES
Subjective:   Charity Duarte is a 38 year old female who is f/u for her cervical MRI.     We have reviewed her cervical MRI.  She has no significant findings to suggest an underlying abnormality.  This is consistent with my belief that this is most likely myofascial.  I have recommended that she get started with physical therapy and have completed an order for that.  She will need at least 2 months of physical therapy and then she can see our chronic pain clinic and they may consider trigger point injections or other cervical interventions that may provide some relief but that would be in a more appropriate setting for her ongoing care.      All questions were answered and we assisted her with making an appointment for the chronic pain clinic in approximately 2 months.

## 2018-10-07 ENCOUNTER — HEALTH MAINTENANCE LETTER (OUTPATIENT)
Age: 38
End: 2018-10-07

## 2018-10-19 DIAGNOSIS — Z84.81 FAMILY HISTORY OF CARRIER OF GENETIC DISEASE: Primary | ICD-10-CM

## 2018-10-19 PROCEDURE — 36415 COLL VENOUS BLD VENIPUNCTURE: CPT | Performed by: MEDICAL GENETICS

## 2018-10-19 PROCEDURE — 40000803 ZZHCL STATISTIC DNA ISOL HIGH PURITY: Performed by: MEDICAL GENETICS

## 2018-10-19 NOTE — PROGRESS NOTES
I met with Charity today to discuss whole exome sequencing for her daughter. She consented to to submitting her sample as part of whole exome sequencing for her daughter.    Tia Ovalle MS, [facility]  Licensed Genetic Counselor  [facility]  (p) [phone]  (f) [phone]

## 2018-10-22 LAB — COPATH REPORT: NORMAL

## 2018-11-05 ENCOUNTER — OFFICE VISIT (OUTPATIENT)
Dept: FAMILY MEDICINE | Facility: CLINIC | Age: 38
End: 2018-11-05
Payer: COMMERCIAL

## 2018-11-05 VITALS
RESPIRATION RATE: 20 BRPM | BODY MASS INDEX: 26.08 KG/M2 | OXYGEN SATURATION: 98 % | TEMPERATURE: 98.1 F | SYSTOLIC BLOOD PRESSURE: 118 MMHG | DIASTOLIC BLOOD PRESSURE: 78 MMHG | HEART RATE: 88 BPM | WEIGHT: 156.7 LBS

## 2018-11-05 DIAGNOSIS — T78.40XA ALLERGIC REACTION, INITIAL ENCOUNTER: Primary | ICD-10-CM

## 2018-11-05 PROCEDURE — 99214 OFFICE O/P EST MOD 30 MIN: CPT | Performed by: NURSE PRACTITIONER

## 2018-11-05 RX ORDER — FLUTICASONE PROPIONATE 50 MCG
SPRAY, SUSPENSION (ML) NASAL
Refills: 3 | COMMUNITY
Start: 2018-10-02 | End: 2021-07-02

## 2018-11-05 RX ORDER — ALBUTEROL SULFATE 90 UG/1
2 AEROSOL, METERED RESPIRATORY (INHALATION) EVERY 6 HOURS PRN
Qty: 1 INHALER | Refills: 0 | Status: SHIPPED | OUTPATIENT
Start: 2018-11-05 | End: 2023-07-21

## 2018-11-05 RX ORDER — CETIRIZINE HYDROCHLORIDE 10 MG/1
10 TABLET ORAL EVERY EVENING
Qty: 90 TABLET | Refills: 3 | Status: SHIPPED | OUTPATIENT
Start: 2018-11-05 | End: 2021-07-02
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
## 2018-11-05 NOTE — MR AVS SNAPSHOT
After Visit Summary   11/5/2018    Charity Duarte    MRN: [id number]           Patient Information     Date Of Birth          1980        Visit Information        Provider Department      11/5/2018 11:40 AM Adriana Christina Ra, APRN CNP [facility]        Today's Diagnoses     Allergic reaction, initial encounter    -  1      Care Instructions    Start taking the zyrtec once daily.  When you are going to encounter marijuana smoke you need to take it twice a day.  I also sent a prescription for the albuterol inhaler with a spacer.            Follow-ups after your visit        Follow-up notes from your care team     Return in about 4 weeks (around 12/3/2018) for follow up.      Your next 10 appointments already scheduled     Nov 05, 2018 11:40 AM CST   Office Visit with JOHN Adams Ra, CNP   [facility]    [address] [facility] [address] [phone]           Bring a current list of meds and any records pertaining to this visit. For Physicals, please bring immunization records and any forms needing to be filled out. Please arrive 10 minutes early to complete paperwork.            Dec 07, 2018 10:20 AM CST   (Arrive by 10:05 AM)   New Patient Visit with JOHN Siu CNP   [facility] ([facility])    [address] [place] [id number] [phone]              Who to contact     If you have questions or need follow up information about today's clinic visit or your schedule please contact [facility] directly at [phone].  Normal or non-critical lab and imaging results will be communicated to you by MyChart, letter or phone within 4 business days after the clinic has received the results. If you do not hear from us within 7 days, please contact the clinic through MyChart or phone. If you have a critical  or abnormal lab result, we will notify you by phone as soon as possible.  Submit refill requests through [employer] or call your pharmacy and they will forward the refill request to us. Please allow 3 business days for your refill to be completed.          Additional Information About Your Visit        Care EveryWhere ID     This is your Care EveryWhere ID. This could be used by other organizations to access your [facility] medical records  [id number]        Your Vitals Were     Pulse Temperature Respirations Pulse Oximetry BMI (Body Mass Index)       88 98.1  F (36.7  C) (Oral) 20 98% 26.08 kg/m2        Blood Pressure from Last 3 Encounters:   11/05/18 118/78   08/24/18 120/78   03/27/18 120/86    Weight from Last 3 Encounters:   11/05/18 156 lb 11.2 oz (71.1 kg)   09/28/18 155 lb (70.3 kg)   08/24/18 155 lb 9.6 oz (70.6 kg)              We Performed the Following     AEROCHAMBER          Today's Medication Changes          These changes are accurate as of 11/5/18 11:19 AM.  If you have any questions, ask your nurse or doctor.               Start taking these medicines.        Dose/Directions    albuterol 108 (90 Base) MCG/ACT inhaler   Commonly known as:  PROAIR HFA/PROVENTIL HFA/VENTOLIN HFA   Used for:  Allergic reaction, initial encounter   Started by:  Adriana Christina Ra, APRN CNP        Dose:  2 puff   Inhale 2 puffs into the lungs every 6 hours as needed for shortness of breath / dyspnea or wheezing   Quantity:  1 Inhaler   Refills:  0       cetirizine 10 MG tablet   Commonly known as:  zyrTEC   Used for:  Allergic reaction, initial encounter   Started by:  Adriana Christina Ra, APRN CNP        Dose:  10 mg   Take 1 tablet (10 mg) by mouth every evening   Quantity:  90 tablet   Refills:  3            Where to get your medicines      These medications were sent to [facility] - [id number]  [address] [address]     Phone:  [phone]     albuterol 108  (90 Base) MCG/ACT inhaler    cetirizine 10 MG tablet                Primary Care Provider Office Phone # Fax #    Jaime Sanchez PA-C [phone] [address]  [facility] [address]        Equal Access to Services     CAROL LAIRD AH: Hadii aad ku hadasho Soomaali, waaxda luqadaha, qaybta kaalmada adeegyada, waxay idiin hayaan adeeg kharash la'aan ah. So [facility] [phone].    ATENCIÓN: Si habla español, tiene a syed disposición servicios gratuitos de asistencia lingüística. Llame al [phone].    We comply with applicable federal civil rights laws and [place] laws. We do not discriminate on the basis of race, color, national origin, age, disability, sex, sexual orientation, or gender identity.            Thank you!     Thank you for choosing [facility]  for your care. Our goal is always to provide you with excellent care. Hearing back from our patients is one way we can continue to improve our services. Please take a few minutes to complete the written survey that you may receive in the mail after your visit with us. Thank you!             Your Updated Medication List - Protect others around you: Learn how to safely use, store and throw away your medicines at www.disposemymeds.org.          This list is accurate as of 11/5/18 11:19 AM.  Always use your most recent med list.                   Brand Name Dispense Instructions for use Diagnosis    albuterol 108 (90 Base) MCG/ACT inhaler    PROAIR HFA/PROVENTIL HFA/VENTOLIN HFA    1 Inhaler    Inhale 2 puffs into the lungs every 6 hours as needed for shortness of breath / dyspnea or wheezing    Allergic reaction, initial encounter       ALEVE PO           ALLERGY PO           cetirizine 10 MG tablet    zyrTEC    90 tablet    Take 1 tablet (10 mg) by mouth every evening    Allergic reaction, initial encounter       cyclobenzaprine 10 MG tablet    FLEXERIL    14 tablet    Take 1 tablet (10 mg) by mouth nightly as needed     Cervicalgia       fluticasone 50 MCG/ACT spray    FLONASE     2 SPRAYS IN EACH NOSTRILS ONCE DAILY        IBUPROFEN PO           meclizine 25 MG tablet    ANTIVERT    30 tablet    Take 1 tablet (25 mg) by mouth every 6 hours as needed for dizziness    Dizziness       TYLENOL PO

## 2018-11-05 NOTE — PATIENT INSTRUCTIONS
Start taking the zyrtec once daily.  When you are going to encounter marijuana smoke you need to take it twice a day.  I also sent a prescription for the albuterol inhaler with a spacer.

## 2018-11-13 ENCOUNTER — TELEPHONE (OUTPATIENT)
Dept: FAMILY MEDICINE | Facility: CLINIC | Age: 38
End: 2018-11-13

## 2018-11-13 NOTE — TELEPHONE ENCOUNTER
Type of outreach:  None  Health Maintenance Due   Topic Date Due     HIV SCREEN (SYSTEM ASSIGNED)  09/09/1997     HPV Q5 YEARS (Complete with PAP)  06/20/2018     PAP Q5 YEARS  06/21/2018     INFLUENZA VACCINE (1) 09/01/2018     Spoke with patient at [facility] 08/2018 about pap. Patient states last pap she had was with last pregnancy.   Nikole Cornelius MA

## 2018-12-07 ENCOUNTER — OFFICE VISIT (OUTPATIENT)
Dept: ANESTHESIOLOGY | Facility: CLINIC | Age: 38
End: 2018-12-07

## 2018-12-07 VITALS — OXYGEN SATURATION: 99 % | HEART RATE: 99 BPM | SYSTOLIC BLOOD PRESSURE: 138 MMHG | DIASTOLIC BLOOD PRESSURE: 86 MMHG

## 2018-12-07 DIAGNOSIS — M79.18 MYOFASCIAL PAIN DYSFUNCTION SYNDROME: Primary | ICD-10-CM

## 2018-12-07 DIAGNOSIS — M79.2 NEUROPATHIC PAIN: ICD-10-CM

## 2018-12-07 RX ORDER — GABAPENTIN 100 MG/1
100 CAPSULE ORAL 3 TIMES DAILY
Qty: 90 CAPSULE | Refills: 1 | Status: SHIPPED | OUTPATIENT
Start: 2018-12-07 | End: 2019-04-26

## 2018-12-07 ASSESSMENT — ENCOUNTER SYMPTOMS
MYALGIAS: 1
EYE REDNESS: 0
DYSURIA: 0
NECK MASS: 0
SINUS CONGESTION: 1
INCREASED ENERGY: 1
HALLUCINATIONS: 0
WEAKNESS: 1
ORTHOPNEA: 0
DOUBLE VISION: 0
LEG PAIN: 1
HOT FLASHES: 1
MUSCLE CRAMPS: 1
HYPERTENSION: 0
MEMORY LOSS: 1
LOSS OF CONSCIOUSNESS: 0
DEPRESSION: 1
HEMATURIA: 0
POLYPHAGIA: 0
DECREASED LIBIDO: 0
INSOMNIA: 1
TROUBLE SWALLOWING: 0
BREAST PAIN: 0
LIGHT-HEADEDNESS: 1
FATIGUE: 1
PARALYSIS: 0
TREMORS: 1
POLYDIPSIA: 0
EXERCISE INTOLERANCE: 1
DIFFICULTY URINATING: 1
SEIZURES: 0
PALPITATIONS: 0
JOINT SWELLING: 1
SORE THROAT: 0
HYPOTENSION: 0
EYE IRRITATION: 0
BREAST MASS: 0
NAIL CHANGES: 1
PANIC: 1
SMELL DISTURBANCE: 0
DIZZINESS: 1
MUSCLE WEAKNESS: 1
CHILLS: 0
SINUS PAIN: 0
SYNCOPE: 0
SPEECH CHANGE: 0
STIFFNESS: 1
FEVER: 0
FLANK PAIN: 0
SKIN CHANGES: 0
NUMBNESS: 1
NIGHT SWEATS: 0
HEADACHES: 1
POOR WOUND HEALING: 0
EYE WATERING: 0
HOARSE VOICE: 0
DECREASED APPETITE: 1
EYE PAIN: 0
SLEEP DISTURBANCES DUE TO BREATHING: 0
WEIGHT LOSS: 0
WEIGHT GAIN: 0
ALTERED TEMPERATURE REGULATION: 1
NECK PAIN: 1
NERVOUS/ANXIOUS: 1
DISTURBANCES IN COORDINATION: 0
TINGLING: 1
DECREASED CONCENTRATION: 1
ARTHRALGIAS: 1
TASTE DISTURBANCE: 0
BACK PAIN: 1
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
## 2018-12-07 NOTE — MR AVS SNAPSHOT
After Visit Summary   12/7/2018    Charity Duarte    MRN: [id number]           Patient Information     Date Of Birth          1980        Visit Information        Provider Department      12/7/2018 10:20 AM Sherin Govea APRN [facility] for Comprehensive [facility]        Today's Diagnoses     Myofascial pain dysfunction syndrome    -  1    Neuropathic pain          Care Instructions    1. Gabapentin 100 mg at bedtime for 1 week.   If tolerating increase to 100 mg in AM, and 100 mg at bedtime for 1 week.   If tolerating increase to 100mg in AM, 100 mg in Afternoon, and 100 mg at bedtime.  Continue at this dose. If tolerating call us at [phone] for further instructions.    2. We referred you to Neurology today. Please call [phone] to set up an appointment.    3. Sign MIMI for Therese MRI of brain.    Follow up: After your Neurology visit.       To speak with a nurse, schedule/reschedule/cancel a clinic appointment, or request a medication refill call: [phone]     You can also reach us by [email or web address]: [email or web address]    For refills, please call on Monday, 1 week before your medication runs out. The doctors are not always in clinic, so this gives us time to get your prescriptions ready.  Please let us know the name of the medication you are requesting a refill of.                                     Follow-ups after your visit        Additional Services     NEUROLOGY ADULT REFERRAL       Your provider has referred you for the following:   Consult at [facility]: Neurology Clinic [facility] [phone]   [email or web address]  General. Consider EMG    Please be aware that coverage of these services is subject to the terms and limitations of your health insurance plan.  Call member services at your health plan with any benefit or coverage questions.      Please bring the following with you to your appointment:    (1) Any  X-Rays, CTs or MRIs which have been performed.  Contact the facility where they were done to arrange for  prior to your scheduled appointment.    (2) List of current medications  (3) This referral request   (4) Any documents/labs given to you for this referral                  Your next 10 appointments already scheduled     Dec 10, 2018 11:20 AM CST   Office Visit with Jaime Sanchez PA-C   [facility]    [address] [facility] [address] [phone]           Bring a current list of meds and any records pertaining to this visit. For Physicals, please bring immunization records and any forms needing to be filled out. Please arrive 10 minutes early to complete paperwork.              Who to contact     Please call your clinic at [phone] to:    Ask questions about your health    Make or cancel appointments    Discuss your medicines    Learn about your test results    Speak to your doctor            Additional Information About Your Visit        MyChart Information     [employer] is an electronic gateway that provides easy, online access to your medical records. With [employer], you can request a clinic appointment, read your test results, renew a prescription or communicate with your care team.     To sign up for [employer] visit the website at [email or web address]   You will be asked to enter the access code listed below, as well as some personal information. Please follow the directions to create your username and password.     Your access code is: [email or web address]  Expires: 2019  6:30 AM     Your access code will  in 90 days. If you need help or a new code, please contact your [facility] or call [phone] for assistance.        Care EveryWhere ID     This is your Care EveryWhere ID. This could be used by other organizations to access your [place] medical records  [id number]        Your Vitals  Were     Pulse Last Period Pulse Oximetry Breastfeeding?          99 12/03/2018 99% No         Blood Pressure from Last 3 Encounters:   12/07/18 138/86   11/05/18 118/78   08/24/18 120/78    Weight from Last 3 Encounters:   11/05/18 71.1 kg (156 lb 11.2 oz)   09/28/18 70.3 kg (155 lb)   08/24/18 70.6 kg (155 lb 9.6 oz)              We Performed the Following     NEUROLOGY ADULT REFERRAL        Primary Care Provider Office Phone # Fax #    Jaime Sanchez PA-C [phone] [address] [facility] [address]        Equal Access to Services     SHAHEED LAIRD : Hadii katelynn lao hadasho Sovenancio, waaxda luqadaha, qaybta kaalmada adeegyada, patricia griggs. So [facility] [phone].    ATENCIÓN: Si habla español, tiene a syed disposición servicios gratuitos de asistencia lingüística. Llame al [phone].    We comply with applicable federal civil rights laws and [place] laws. We do not discriminate on the basis of race, color, national origin, age, disability, sex, sexual orientation, or gender identity.            Thank you!     Thank you for choosing [facility] FOR COMPREHENSIVE PAIN MANAGEMENT  for your care. Our goal is always to provide you with excellent care. Hearing back from our patients is one way we can continue to improve our services. Please take a few minutes to complete the written survey that you may receive in the mail after your visit with us. Thank you!             Your Updated Medication List - Protect others around you: Learn how to safely use, store and throw away your medicines at www.disposemymeds.org.          This list is accurate as of 12/7/18 11:05 AM.  Always use your most recent med list.                   Brand Name Dispense Instructions for use Diagnosis    albuterol 108 (90 Base) MCG/ACT inhaler    PROAIR HFA/PROVENTIL HFA/VENTOLIN HFA    1 Inhaler    Inhale 2 puffs into the lungs every 6 hours as needed for shortness of breath / dyspnea  or wheezing    Allergic reaction, initial encounter       ALEVE PO           ALLERGY PO           cetirizine 10 MG tablet    zyrTEC    90 tablet    Take 1 tablet (10 mg) by mouth every evening    Allergic reaction, initial encounter       cyclobenzaprine 10 MG tablet    FLEXERIL    14 tablet    Take 1 tablet (10 mg) by mouth nightly as needed    Cervicalgia       fluticasone 50 MCG/ACT nasal spray    FLONASE     2 SPRAYS IN EACH NOSTRILS ONCE DAILY        IBUPROFEN PO           meclizine 25 MG tablet    ANTIVERT    30 tablet    Take 1 tablet (25 mg) by mouth every 6 hours as needed for dizziness    Dizziness       TYLENOL PO

## 2018-12-07 NOTE — PATIENT INSTRUCTIONS
1. Gabapentin 100 mg at bedtime for 1 week.   If tolerating increase to 100 mg in AM, and 100 mg at bedtime for 1 week.   If tolerating increase to 100mg in AM, 100 mg in Afternoon, and 100 mg at bedtime.  Continue at this dose. If tolerating call us at [phone] for further instructions.    2. We referred you to Neurology today. Please call [phone] to set up an appointment.    3. Sign MIMI for Therese MRI of brain.    Follow up: After your Neurology visit.       To speak with a nurse, schedule/reschedule/cancel a clinic appointment, or request a medication refill call: [phone]     You can also reach us by [employer]: [email or web address]    For refills, please call on Monday, 1 week before your medication runs out. The doctors are not always in clinic, so this gives us time to get your prescriptions ready.  Please let us know the name of the medication you are requesting a refill of.

## 2018-12-10 ENCOUNTER — OFFICE VISIT (OUTPATIENT)
Dept: FAMILY MEDICINE | Facility: CLINIC | Age: 38
End: 2018-12-10
Payer: MEDICAID

## 2018-12-10 VITALS
SYSTOLIC BLOOD PRESSURE: 114 MMHG | TEMPERATURE: 97 F | HEIGHT: 65 IN | HEART RATE: 83 BPM | DIASTOLIC BLOOD PRESSURE: 75 MMHG | WEIGHT: 153.3 LBS | BODY MASS INDEX: 25.54 KG/M2 | RESPIRATION RATE: 14 BRPM | OXYGEN SATURATION: 97 %

## 2018-12-10 DIAGNOSIS — T78.40XD ALLERGIC REACTION, SUBSEQUENT ENCOUNTER: Primary | ICD-10-CM

## 2018-12-10 PROCEDURE — 99213 OFFICE O/P EST LOW 20 MIN: CPT | Performed by: PHYSICIAN ASSISTANT

## 2018-12-10 ASSESSMENT — MIFFLIN-ST. JEOR: SCORE: 1371.24
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
## 2019-02-11 ENCOUNTER — TRANSFERRED RECORDS (OUTPATIENT)
Dept: HEALTH INFORMATION MANAGEMENT | Facility: CLINIC | Age: 39
End: 2019-02-11

## 2019-04-26 ENCOUNTER — OFFICE VISIT (OUTPATIENT)
Dept: NEUROLOGY | Facility: CLINIC | Age: 39
End: 2019-04-26
Attending: NURSE PRACTITIONER
Payer: COMMERCIAL

## 2019-04-26 VITALS
HEIGHT: 65 IN | BODY MASS INDEX: 25.51 KG/M2 | TEMPERATURE: 98.1 F | DIASTOLIC BLOOD PRESSURE: 74 MMHG | SYSTOLIC BLOOD PRESSURE: 123 MMHG | HEART RATE: 89 BPM | OXYGEN SATURATION: 98 %

## 2019-04-26 DIAGNOSIS — M54.2 NECK PAIN: ICD-10-CM

## 2019-04-26 DIAGNOSIS — G89.29 CHRONIC BILATERAL BACK PAIN, UNSPECIFIED BACK LOCATION: ICD-10-CM

## 2019-04-26 DIAGNOSIS — R20.0 ARM NUMBNESS LEFT: ICD-10-CM

## 2019-04-26 DIAGNOSIS — M54.9 CHRONIC BILATERAL BACK PAIN, UNSPECIFIED BACK LOCATION: ICD-10-CM

## 2019-04-26 DIAGNOSIS — R20.0 BILATERAL LEG NUMBNESS: Primary | ICD-10-CM

## 2019-04-26 DIAGNOSIS — R29.898 BILATERAL LEG WEAKNESS: ICD-10-CM

## 2019-04-26 RX ORDER — GABAPENTIN 100 MG/1
100 CAPSULE ORAL 3 TIMES DAILY
Qty: 90 CAPSULE | Refills: 1 | Status: SHIPPED | OUTPATIENT
Start: 2019-04-26 | End: 2019-05-23

## 2019-04-26 ASSESSMENT — PAIN SCALES - GENERAL: PAINLEVEL: SEVERE PAIN (6)

## 2019-04-26 NOTE — NURSING NOTE
Chief Complaint   Patient presents with     New Patient     UMP NEW MYOFASCIAL PAIN DYSFUNCTION        Latoya Rocha LPN
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
## 2019-05-23 ENCOUNTER — OFFICE VISIT (OUTPATIENT)
Dept: NEUROLOGY | Facility: CLINIC | Age: 39
End: 2019-05-23
Attending: PSYCHIATRY & NEUROLOGY
Payer: COMMERCIAL

## 2019-05-23 DIAGNOSIS — R20.0 BILATERAL LEG NUMBNESS: ICD-10-CM

## 2019-05-23 DIAGNOSIS — G89.29 CHRONIC BILATERAL BACK PAIN, UNSPECIFIED BACK LOCATION: ICD-10-CM

## 2019-05-23 DIAGNOSIS — M54.9 CHRONIC BILATERAL BACK PAIN, UNSPECIFIED BACK LOCATION: ICD-10-CM

## 2019-05-23 DIAGNOSIS — M54.2 NECK PAIN: ICD-10-CM

## 2019-05-23 DIAGNOSIS — R29.898 BILATERAL LEG WEAKNESS: ICD-10-CM

## 2019-05-23 RX ORDER — GABAPENTIN 100 MG/1
100 CAPSULE ORAL 3 TIMES DAILY
Qty: 180 CAPSULE | Refills: 1 | Status: SHIPPED | OUTPATIENT
Start: 2019-05-23 | End: 2019-06-07

## 2019-05-23 NOTE — PROGRESS NOTES
[facility]  Electrodiagnostic Laboratory    Nerve Conduction & EMG Report          Patient:       Charity Duarte  Patient ID:    [id number]  Gender:        Female  YOB: 1980  Age:           38 Years 8 Months      Referring Physician: Mayco Coulter MD    History & Examination:    38 year old woman with history of low back pain and paresthesias shooting from her buttocks to her lower legs and feet bilaterally. Query polyneuropathy vs lumbosacral radiculopathies.     Techniques: Motor and sensory conduction studies were done with surface recording electrodes. Temperature was monitored and recorded throughout the study. Upper extremities were maintained at a temperature of 32 degrees Centigrade or higher.  Lower extremities were maintained at a temperature of 31degrees Centigrade or higher. EMG was done with a concentric needle electrode.     Results:    Bilateral sural and the right superficial peroneal antidromic sensory NCSs were normal. Bilateral deep peroneal and tibial motor NCSs were normal. EMG of bilateral tibialis anterior, medial gastrocnemius, short heads of the biceps femoris, gluteus medius, right vastus lateralis, left vastus medialis, and the right L5 paraspinals, was normal.     Interpretation:    Normal study. No electrodiagnostic evidence of large fiber polyneuropathy or lumbosacral radiculopathies.     EMG Physician:    Kuldeep Lopez MD       Sensory NCS      Nerve / Sites Rec. Site Onset Peak NP Amp Ref. PP Amp Dist Prashant Ref. Temp     ms ms  V  V  V cm m/s m/s  C   R SURAL - Lat Mall      Calf Ankle 3.02 3.91 18.6 8.0 20.3 14 46.3 38.0 31.1   L SURAL - Lat Mall      Calf Ankle 3.07 4.01 12.0 8.0 14.0 14 45.6 38.0 31.4   R SUP PERONEAL      Lat Leg Ramirez 2.60 3.28 13.5  14.2 12.5 48.0 38.0 31.4       Motor NCS      Nerve / Sites Rec. Site Lat Ref. Amp Ref. Rel Amp Dist Prashant Ref. Dur. Area Temp.     ms ms mV mV % cm m/s m/s ms %  C   R DEEP PERONEAL - EDB      Ankle EDB  4.01 6.00 9.4 2.5 100 8   6.41 100 31.4      FibHead EDB 10.26  8.5  90.5 28.5 45.6 38.0 6.67 92.5 31.2      Pop Fos EDB 11.77  8.4  89.6 7.5 49.7 38.0 6.88 91.5 31.2   L DEEP PERONEAL - EDB      Ankle EDB 4.22 6.00 7.9 2.5 100 8   6.56 100 31.4   R TIBIAL - AH      Ankle AH 3.65 6.00 11.8 4.0 100 8   7.14 100 31.4      Pop Fos AH 11.82  8.8  74.8 37 45.2 38.0 8.33 88.6 31.3   L TIBIAL - AH      Ankle AH 2.92 6.00 11.0 4.0 100 8   7.19 100 31.4       EMG Summary Table     Spontaneous MUAP Recruitment    IA Fib/PSW Fasc H.F. Amp Dur. PPP Pattern   R. TIB ANTERIOR N None None None N N N N   R. VAST LATERALIS N None None None N N N N   R. GASTROCN (MED) N None None None N N N N   R. BIC FEM (S HEAD) N None None None N N N N   R. GLUTEUS MED N None None None N N N N   R. L5 PSP N None None None N N N N   L. TIB ANTERIOR N None None None N N N N   L. VAST MEDIALIS N None None None N N N N   L. GASTROCN (MED) N None None None N N N N   L. BIC FEM (S HEAD) N None None None N N N N   L. GLUTEUS MED N None None None N N N N

## 2019-05-23 NOTE — LETTER
5/23/2019     RE: Charity Duarte  [address] [place] [id number]     Dear Colleague,    Thank you for referring your patient, Charity Duarte, to the [facility] EMG at [facility]. Please see a copy of my visit note below.        [facility]  Electrodiagnostic Laboratory    Nerve Conduction & EMG Report          Patient:       Charity Duarte  Patient ID:    [id number]  Gender:        Female  YOB: 1980  Age:           38 Years 8 Months      Referring Physician: Mayco Coulter MD    History & Examination:    38 year old woman with history of low back pain and paresthesias shooting from her buttocks to her lower legs and feet bilaterally. Query polyneuropathy vs lumbosacral radiculopathies.     Techniques: Motor and sensory conduction studies were done with surface recording electrodes. Temperature was monitored and recorded throughout the study. Upper extremities were maintained at a temperature of 32 degrees Centigrade or higher.  Lower extremities were maintained at a temperature of 31degrees Centigrade or higher. EMG was done with a concentric needle electrode.     Results:    Bilateral sural and the right superficial peroneal antidromic sensory NCSs were normal. Bilateral deep peroneal and tibial motor NCSs were normal. EMG of bilateral tibialis anterior, medial gastrocnemius, short heads of the biceps femoris, gluteus medius, right vastus lateralis, left vastus medialis, and the right L5 paraspinals, was normal.     Interpretation:    Normal study. No electrodiagnostic evidence of large fiber polyneuropathy or lumbosacral radiculopathies.     EMG Physician:    Kuldeep Lopez MD       Sensory NCS      Nerve / Sites Rec. Site Onset Peak NP Amp Ref. PP Amp Dist Prashant Ref. Temp     ms ms  V  V  V cm m/s m/s  C   R SURAL - Lat Mall      Calf Ankle 3.02 3.91 18.6 8.0 20.3 14 46.3 38.0 31.1   L SURAL - Lat Mall      Calf Ankle 3.07 4.01 12.0 8.0 14.0 14 45.6  38.0 31.4   R SUP PERONEAL      Lat Leg Ramirez 2.60 3.28 13.5  14.2 12.5 48.0 38.0 31.4       Motor NCS      Nerve / Sites Rec. Site Lat Ref. Amp Ref. Rel Amp Dist Prashant Ref. Dur. Area Temp.     ms ms mV mV % cm m/s m/s ms %  C   R DEEP PERONEAL - EDB      Ankle EDB 4.01 6.00 9.4 2.5 100 8   6.41 100 31.4      FibHead EDB 10.26  8.5  90.5 28.5 45.6 38.0 6.67 92.5 31.2      Pop Fos EDB 11.77  8.4  89.6 7.5 49.7 38.0 6.88 91.5 31.2   L DEEP PERONEAL - EDB      Ankle EDB 4.22 6.00 7.9 2.5 100 8   6.56 100 31.4   R TIBIAL - AH      Ankle AH 3.65 6.00 11.8 4.0 100 8   7.14 100 31.4      Pop Fos AH 11.82  8.8  74.8 37 45.2 38.0 8.33 88.6 31.3   L TIBIAL - AH      Ankle AH 2.92 6.00 11.0 4.0 100 8   7.19 100 31.4       EMG Summary Table     Spontaneous MUAP Recruitment    IA Fib/PSW Fasc H.F. Amp Dur. PPP Pattern   R. TIB ANTERIOR N None None None N N N N   R. VAST LATERALIS N None None None N N N N   R. GASTROCN (MED) N None None None N N N N   R. BIC FEM (S HEAD) N None None None N N N N   R. GLUTEUS MED N None None None N N N N   R. L5 PSP N None None None N N N N   L. TIB ANTERIOR N None None None N N N N   L. VAST MEDIALIS N None None None N N N N   L. GASTROCN (MED) N None None None N N N N   L. BIC FEM (S HEAD) N None None None N N N N   L. GLUTEUS MED N None None None N N N N                    Again, thank you for allowing me to participate in the care of your patient.      Sincerely,    Kuldeep Lopez MD

## 2019-06-07 ENCOUNTER — OFFICE VISIT (OUTPATIENT)
Dept: NEUROLOGY | Facility: CLINIC | Age: 39
End: 2019-06-07
Payer: COMMERCIAL

## 2019-06-07 VITALS
BODY MASS INDEX: 25.52 KG/M2 | OXYGEN SATURATION: 98 % | HEART RATE: 71 BPM | HEIGHT: 65 IN | SYSTOLIC BLOOD PRESSURE: 109 MMHG | WEIGHT: 153.2 LBS | RESPIRATION RATE: 16 BRPM | TEMPERATURE: 97.9 F | DIASTOLIC BLOOD PRESSURE: 69 MMHG

## 2019-06-07 DIAGNOSIS — R20.2 NUMBNESS AND TINGLING: ICD-10-CM

## 2019-06-07 DIAGNOSIS — M54.9 CHRONIC BILATERAL BACK PAIN, UNSPECIFIED BACK LOCATION: ICD-10-CM

## 2019-06-07 DIAGNOSIS — R20.0 BILATERAL LEG NUMBNESS: ICD-10-CM

## 2019-06-07 DIAGNOSIS — M54.2 NECK PAIN: ICD-10-CM

## 2019-06-07 DIAGNOSIS — G89.29 OTHER CHRONIC PAIN: Primary | ICD-10-CM

## 2019-06-07 DIAGNOSIS — R20.0 NUMBNESS AND TINGLING: ICD-10-CM

## 2019-06-07 DIAGNOSIS — G89.29 CHRONIC BILATERAL BACK PAIN, UNSPECIFIED BACK LOCATION: ICD-10-CM

## 2019-06-07 RX ORDER — GABAPENTIN 100 MG/1
CAPSULE ORAL
Qty: 210 CAPSULE | Refills: 3 | Status: SHIPPED | OUTPATIENT
Start: 2019-06-07 | End: 2019-10-17

## 2019-06-07 ASSESSMENT — ENCOUNTER SYMPTOMS
NIGHT SWEATS: 0
FATIGUE: 1
DEPRESSION: 1
DIZZINESS: 1
WEIGHT LOSS: 0
DYSURIA: 0
COUGH: 1
DIFFICULTY URINATING: 1
EYE REDNESS: 0
DYSPNEA ON EXERTION: 0
PANIC: 1
STIFFNESS: 1
EYE WATERING: 0
DIARRHEA: 0
MUSCLE CRAMPS: 1
WEIGHT GAIN: 0
RECTAL PAIN: 0
EXERCISE INTOLERANCE: 1
NECK PAIN: 1
TROUBLE SWALLOWING: 0
INSOMNIA: 1
SHORTNESS OF BREATH: 0
DECREASED LIBIDO: 1
WEAKNESS: 1
DOUBLE VISION: 0
DISTURBANCES IN COORDINATION: 1
PARALYSIS: 0
BLOOD IN STOOL: 0
SINUS PAIN: 0
VOMITING: 0
ORTHOPNEA: 0
SORE THROAT: 0
DECREASED CONCENTRATION: 1
SLEEP DISTURBANCES DUE TO BREATHING: 0
MYALGIAS: 1
BOWEL INCONTINENCE: 0
HALLUCINATIONS: 0
POLYDIPSIA: 0
JAUNDICE: 0
ALTERED TEMPERATURE REGULATION: 1
HEADACHES: 1
BACK PAIN: 1
SPUTUM PRODUCTION: 1
HYPOTENSION: 0
EYE IRRITATION: 0
NUMBNESS: 1
SNORES LOUDLY: 0
SMELL DISTURBANCE: 0
ABDOMINAL PAIN: 0
NAUSEA: 0
DECREASED APPETITE: 1
LOSS OF CONSCIOUSNESS: 0
BLOATING: 0
LEG PAIN: 1
POSTURAL DYSPNEA: 0
LIGHT-HEADEDNESS: 1
POLYPHAGIA: 0
MEMORY LOSS: 1
NECK MASS: 0
EYE PAIN: 0
SPEECH CHANGE: 0
SYNCOPE: 0
JOINT SWELLING: 1
TINGLING: 1
HYPERTENSION: 0
NERVOUS/ANXIOUS: 1
INCREASED ENERGY: 1
CONSTIPATION: 0
FLANK PAIN: 0
HEARTBURN: 1
HOT FLASHES: 0
TREMORS: 0
HEMOPTYSIS: 0
PALPITATIONS: 0
COUGH DISTURBING SLEEP: 0
FEVER: 0
TASTE DISTURBANCE: 0
ARTHRALGIAS: 1
CHILLS: 0
SEIZURES: 0
POOR WOUND HEALING: 0
SKIN CHANGES: 0
NAIL CHANGES: 1
HEMATURIA: 0
MUSCLE WEAKNESS: 1
SINUS CONGESTION: 1
WHEEZING: 0
HOARSE VOICE: 1

## 2019-06-07 ASSESSMENT — PAIN SCALES - GENERAL: PAINLEVEL: MILD PAIN (3)

## 2019-06-07 ASSESSMENT — MIFFLIN-ST. JEOR: SCORE: 1370.79
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
## 2019-10-17 DIAGNOSIS — G89.29 CHRONIC BILATERAL BACK PAIN, UNSPECIFIED BACK LOCATION: ICD-10-CM

## 2019-10-17 DIAGNOSIS — M54.2 NECK PAIN: ICD-10-CM

## 2019-10-17 DIAGNOSIS — M54.9 CHRONIC BILATERAL BACK PAIN, UNSPECIFIED BACK LOCATION: ICD-10-CM

## 2019-10-17 DIAGNOSIS — R20.0 BILATERAL LEG NUMBNESS: ICD-10-CM

## 2019-10-17 RX ORDER — GABAPENTIN 100 MG/1
CAPSULE ORAL
Qty: 210 CAPSULE | Refills: 3 | Status: SHIPPED | OUTPATIENT
Start: 2019-10-17 | End: 2021-07-02

## 2019-10-17 NOTE — TELEPHONE ENCOUNTER
Rx Authorization:    Requested Medication/ Dose gabapentin (NEURONTIN) 100 MG capsule    Date last refill ordered: 06/07/19    Quantity ordered: 210    # refills: 3    Date of last clinic visit with ordering provider: 06/07/19    Date of next clinic visit with ordering provider: None Scheduled     All pertinent protocol data (lab date/result):     Include pertinent information from patients message:

## 2020-06-11 ENCOUNTER — TRANSFERRED RECORDS (OUTPATIENT)
Dept: HEALTH INFORMATION MANAGEMENT | Facility: CLINIC | Age: 40
End: 2020-06-11

## 2021-01-28 ENCOUNTER — TRANSFERRED RECORDS (OUTPATIENT)
Dept: HEALTH INFORMATION MANAGEMENT | Facility: CLINIC | Age: 41
End: 2021-01-28

## 2021-04-21 ENCOUNTER — TRANSFERRED RECORDS (OUTPATIENT)
Dept: HEALTH INFORMATION MANAGEMENT | Facility: CLINIC | Age: 41
End: 2021-04-21

## 2021-06-16 ENCOUNTER — OFFICE VISIT (OUTPATIENT)
Dept: OBGYN | Facility: CLINIC | Age: 41
End: 2021-06-16
Payer: COMMERCIAL

## 2021-06-16 ENCOUNTER — TELEPHONE (OUTPATIENT)
Dept: OBGYN | Facility: CLINIC | Age: 41
End: 2021-06-16

## 2021-06-16 ENCOUNTER — PREP FOR PROCEDURE (OUTPATIENT)
Dept: OBGYN | Facility: CLINIC | Age: 41
End: 2021-06-16

## 2021-06-16 VITALS
SYSTOLIC BLOOD PRESSURE: 138 MMHG | HEIGHT: 65 IN | WEIGHT: 188 LBS | BODY MASS INDEX: 31.32 KG/M2 | DIASTOLIC BLOOD PRESSURE: 72 MMHG

## 2021-06-16 DIAGNOSIS — Z30.2 REQUEST FOR STERILIZATION: Primary | ICD-10-CM

## 2021-06-16 DIAGNOSIS — Z12.4 SCREENING FOR MALIGNANT NEOPLASM OF CERVIX: ICD-10-CM

## 2021-06-16 DIAGNOSIS — R10.31 RIGHT LOWER QUADRANT PAIN: ICD-10-CM

## 2021-06-16 DIAGNOSIS — Z11.3 SCREEN FOR STD (SEXUALLY TRANSMITTED DISEASE): ICD-10-CM

## 2021-06-16 DIAGNOSIS — E66.09 CLASS 1 OBESITY DUE TO EXCESS CALORIES WITHOUT SERIOUS COMORBIDITY WITH BODY MASS INDEX (BMI) OF 31.0 TO 31.9 IN ADULT: ICD-10-CM

## 2021-06-16 DIAGNOSIS — Z30.09 CONSULTATION FOR FEMALE STERILIZATION: Primary | ICD-10-CM

## 2021-06-16 DIAGNOSIS — E66.811 CLASS 1 OBESITY DUE TO EXCESS CALORIES WITHOUT SERIOUS COMORBIDITY WITH BODY MASS INDEX (BMI) OF 31.0 TO 31.9 IN ADULT: ICD-10-CM

## 2021-06-16 LAB — HCG, QUAL URINE: NORMAL

## 2021-06-16 PROCEDURE — G0145 SCR C/V CYTO,THINLAYER,RESCR: HCPCS | Performed by: OBSTETRICS & GYNECOLOGY

## 2021-06-16 PROCEDURE — 84703 CHORIONIC GONADOTROPIN ASSAY: CPT | Performed by: OBSTETRICS & GYNECOLOGY

## 2021-06-16 PROCEDURE — 99204 OFFICE O/P NEW MOD 45 MIN: CPT | Performed by: OBSTETRICS & GYNECOLOGY

## 2021-06-16 PROCEDURE — 87491 CHLMYD TRACH DNA AMP PROBE: CPT | Performed by: OBSTETRICS & GYNECOLOGY

## 2021-06-16 PROCEDURE — 87624 HPV HI-RISK TYP POOLED RSLT: CPT | Performed by: OBSTETRICS & GYNECOLOGY

## 2021-06-16 PROCEDURE — 87591 N.GONORRHOEAE DNA AMP PROB: CPT | Performed by: OBSTETRICS & GYNECOLOGY

## 2021-06-16 RX ORDER — BUSPIRONE HYDROCHLORIDE 15 MG/1
7.5 TABLET ORAL 2 TIMES DAILY
COMMUNITY
End: 2023-07-21

## 2021-06-16 ASSESSMENT — MIFFLIN-ST. JEOR: SCORE: 1518.64

## 2021-06-16 NOTE — PROGRESS NOTES
"    Assessment & Plan     Consultation for female sterilization  - I reviewed that Pt is an appropriate candidate for Lap Bilat Salpingectomy. Her main risk factors preop are her moderate obesity as well as thus far undiagnosed RLQ pain.  - Provided her U/S and other w/u below are all normal, will proceed w/ surgery at least 30 days after MA form (signed today) date, and will need preop w/ PCP.  - Danika-Operative Worksheet    Screening for malignant neoplasm of cervix  - Overdue for screening  - Pap imaged thin layer screen with HPV - recommended age 30 - 65 years (select HPV order below)  - HPV High Risk Types DNA Cervical    Screen for STD (sexually transmitted disease)  - Also due to RLQ pain  - NEISSERIA GONORRHOEA PCR  - CHLAMYDIA TRACHOMATIS PCR    Right lower quadrant pain  - R/O pregnancy, also assess right adnexa by U/S.  May be GI or musculoskeletal.  - HCL HCG, URINE, NURSE BACKOFFICE  - US Pelvic Complete with Transvaginal; Future    Class 1 obesity due to excess calories without serious comorbidity with body mass index (BMI) of 31.0 to 31.9 in adult  - Risk factor for surgery               Tobacco Cessation:   reports that she has been smoking cigarettes. She has been smoking about 1.00 pack per day. She has never used smokeless tobacco.      BMI:   Estimated body mass index is 31.28 kg/m  as calculated from the following:    Height as of this encounter: 1.651 m (5' 5\").    Weight as of this encounter: 85.3 kg (188 lb).           No follow-ups on file.    Jeffrey Bansal MD  [facility] [place] is a 40 year old who presents for the following health issues     Pt here to request a permanent sterilization.  She has had multiple SABs, and had a recent 1 trimester pregnancy loss of twins.  She has had 3 children already and has no desire for future childbearing.  She has regular menses Q 28 days, x 7 days, avg flow.  No IMBx.  No abn discharge.  Pt has some RLQ " "pain on and off.  Not severe. Has menstrual cramps but takes ibuprofen, Tylenol, and uses heating pad with some relief.  Her LMP 5/2021.  Is overdue for Pap/HPV co-test.             Review of Systems         Objective    /72 (BP Location: Right arm, Patient Position: Sitting, Cuff Size: Adult Large)   Ht 1.651 m (5' 5\")   Wt 85.3 kg (188 lb)   LMP 04/26/2021 (Within Weeks)   BMI 31.28 kg/m    Body mass index is 31.28 kg/m .  Physical Exam     Abdomen- Abdomen soft, non-tender. BS normal. No masses, organomegaly, positive findings: obese  Pelvic- Exam chaperoned by nurse, External genitalia normal, Bartholin's glands normal, Hebo's glands normal, Urethral meatus normal, Urethra normal, Bladder normal, Vagina with normal rugae, no abnormal lesions, no abnormal discharge, Normal cervix without lesions or mucopus, no cervical motion tenderness, Uterus normal size, shape, and contour, no masses, non-tender, Adnexa normal size without masses or tenderness bilaterally, Anus normal, Pelvic exam limited by obesity, Pap smear was Done,  HPV Done, GC/Chlam probe was Done                "

## 2021-06-16 NOTE — LETTER
June 29, 2021      Charity Duarte  [address] [place] [phone]        Dear ,    We are writing to inform you of your test results.    Your test results fall within the expected range(s) or remain unchanged from previous results.  Please continue with current treatment plan.    Resulted Orders   HCL HCG, URINE, NURSE BACKOFFICE   Result Value Ref Range    hCG, Qual Urine Neg    NEISSERIA GONORRHOEA PCR   Result Value Ref Range    Specimen Descrip Cervix     N Gonorrhea PCR Negative NEG^Negative      Comment:      Negative for N. gonorrhoeae rRNA by transcription mediated amplification.  A negative result by transcription mediated amplification does not preclude   the presence of N. gonorrhoeae infection because results are dependent on   proper and adequate collection, absence of inhibitors, and sufficient rRNA to   be detected.     CHLAMYDIA TRACHOMATIS PCR   Result Value Ref Range    Specimen Description Cervix     Chlamydia Trachomatis PCR Negative NEG^Negative      Comment:      Negative for C. trachomatis rRNA by transcription mediated amplification.  A negative result by transcription mediated amplification does not preclude   the presence of C. trachomatis infection because results are dependent on   proper and adequate collection, absence of inhibitors, and sufficient rRNA to   be detected.         If you have any questions or concerns, please call the clinic at the number listed above.       Sincerely,      Jeffrey Bansal MD

## 2021-06-16 NOTE — NURSING NOTE
"Chief Complaint   Patient presents with     Miscarriage     Consult     Hysterectomy        Initial /72 (BP Location: Right arm, Patient Position: Sitting, Cuff Size: Adult Large)   Ht 1.651 m (5' 5\")   Wt 85.3 kg (188 lb)   LMP 2021 (Within Weeks)   BMI 31.28 kg/m   Estimated body mass index is 31.28 kg/m  as calculated from the following:    Height as of this encounter: 1.651 m (5' 5\").    Weight as of this encounter: 85.3 kg (188 lb).  BP completed using cuff size: regular    Questioned patient about current smoking habits.  Pt. has never smoked.          The following HM Due: NONE    Chavez Shabazz CMA                "

## 2021-06-16 NOTE — TELEPHONE ENCOUNTER
Procedure name(s) - multi select Laparoscopic bilateral salpingectomy   Reason for procedure Desires permanent sterilization   Surgeon: Rolf   Is this a multi surgeon case? No   Laterality Bilateral   Request for additional equipment Other (see comments)   Comment: None   Anesthesia General   Initiate Pre-op orders for above procedure: Yes, as ordered in Epic   Comment: Additional orders noted there also   Location of Case: [facility]   Sterilization or Hysterectomy consent signed in advance: Yes (note date signed in comments)   PA Assistant: Yes   Urgency of Surgery: Routine   Surgeon Procedure Time (incision to closure) in minutes (per procedure as applicable) 60   Note:  Surgical Case Time Needed (in minutes)   Patient Class (for admit prior to surgery, specify number of days in comments): Same day (hospital outpatient)   Why can t this outpatient surgery be done at the [facility] or  ASC? N/A   H&P To Be Completed By: PCP   Post-Op Appointment None   Vendor Needed? No

## 2021-06-17 LAB
C TRACH DNA SPEC QL NAA+PROBE: NEGATIVE
N GONORRHOEA DNA SPEC QL NAA+PROBE: NEGATIVE
SPECIMEN SOURCE: NORMAL
SPECIMEN SOURCE: NORMAL

## 2021-06-18 DIAGNOSIS — Z11.59 ENCOUNTER FOR SCREENING FOR OTHER VIRAL DISEASES: ICD-10-CM

## 2021-06-18 PROBLEM — Z30.2 REQUEST FOR STERILIZATION: Status: ACTIVE | Noted: 2021-06-18

## 2021-06-18 NOTE — TELEPHONE ENCOUNTER
Type of surgery: laparoscopic bilateral salpingectomy  Location of surgery: [facility]  Date and time of surgery: 8/3/21 @ 11:00 am  Surgeon: Dr. Bansal  Pre-Op Appt Date: Patient advised to schedule.  Post-Op Appt Date:    Packet sent out: Yes  Pre-cert/Authorization completed:  No  Date: 6/18/21

## 2021-06-20 LAB
COPATH REPORT: NORMAL
PAP: NORMAL

## 2021-06-21 DIAGNOSIS — R10.31 RIGHT LOWER QUADRANT PAIN: ICD-10-CM

## 2021-06-21 PROCEDURE — 76830 TRANSVAGINAL US NON-OB: CPT | Performed by: OBSTETRICS & GYNECOLOGY

## 2021-06-21 PROCEDURE — 76856 US EXAM PELVIC COMPLETE: CPT | Performed by: OBSTETRICS & GYNECOLOGY

## 2021-06-22 LAB
FINAL DIAGNOSIS: NORMAL
HPV HR 12 DNA CVX QL NAA+PROBE: NEGATIVE
HPV16 DNA SPEC QL NAA+PROBE: NEGATIVE
HPV18 DNA SPEC QL NAA+PROBE: NEGATIVE
SPECIMEN DESCRIPTION: NORMAL
SPECIMEN SOURCE CVX/VAG CYTO: NORMAL

## 2021-06-25 ENCOUNTER — TELEPHONE (OUTPATIENT)
Dept: OBGYN | Facility: CLINIC | Age: 41
End: 2021-06-25

## 2021-06-25 DIAGNOSIS — R93.89 ENDOMETRIAL THICKENING ON ULTRASOUND: Primary | ICD-10-CM

## 2021-06-25 PROBLEM — N83.292 COMPLEX CYST OF BOTH OVARIES: Status: ACTIVE | Noted: 2021-06-25

## 2021-06-25 PROBLEM — G89.29 CHRONIC PELVIC PAIN IN FEMALE: Status: ACTIVE | Noted: 2021-06-25

## 2021-06-25 PROBLEM — R10.2 CHRONIC PELVIC PAIN IN FEMALE: Status: ACTIVE | Noted: 2021-06-25

## 2021-06-25 PROBLEM — N83.291 COMPLEX CYST OF BOTH OVARIES: Status: ACTIVE | Noted: 2021-06-25

## 2021-06-25 NOTE — TELEPHONE ENCOUNTER
I spoke w/ Pt by phone.  I advised Pt about the pros and cons of removing the right ovary, including that if the pain is not due to the ovary itself (eg. If Pt has endometriosis or other cause such as GI or musculoskeletal causes for RLQ pain), removal of the right ovary will not resolve pain.  Also Pt is aware that removal of the right ovary is also going to require a slightly larger incision to remove it, so one incision will be closer to 3 cm to remove it.  Pt is okay with this.  She very much wants the right ovary to be removed.  Also reviewed that there is a left ovarian cyst that may be an endometrioma, so if possible will remove that cyst if possible at the same time.  Will advised surgery scheduler that the case should be changed to a Laparoscopic Right salpingo-oophorectomy, Left salpingectomy, possible Left ovarian cystectomy.    Finally, Pt has regular menses Q 28 days lasting 7 days with normal flow, and no IMBx.  Given the thickened endometrium on the U/S which is likely to be due to scan being done mid-luteal phase, I think a repeat U/S done right as menses ends will show it to be normal thickness.  Will have triage RN assist her to get another U/S done to check endometrial stripe right after next menses ends.  If it is still thickened, then will have Pt come in for EMBx well-before surgery.

## 2021-06-25 NOTE — TELEPHONE ENCOUNTER
----- Message from Koki Horn RN sent at 6/22/2021 12:26 PM CDT -----  Pt advised of results.  Pt is scheduled for a tubal ligation on 8/3/21.    Pt would like to have her right ovary removed as she has had numerous cysts on that ovary and is tired of being in so much pain.     I advised the pt that Dr. Bansal is out of the office until Friday. She is hoping to have her right ovary removed when she has her tubal ligation.    Koki PATEL RN

## 2021-06-25 NOTE — TELEPHONE ENCOUNTER
Order placed for f/u pelvic US. The schedulers will contact the pt to get her scheduled.  Koki PATEL RN

## 2021-07-02 ENCOUNTER — OFFICE VISIT (OUTPATIENT)
Dept: FAMILY MEDICINE | Facility: CLINIC | Age: 41
End: 2021-07-02
Payer: COMMERCIAL

## 2021-07-02 VITALS
TEMPERATURE: 98.1 F | BODY MASS INDEX: 31.24 KG/M2 | OXYGEN SATURATION: 97 % | SYSTOLIC BLOOD PRESSURE: 110 MMHG | DIASTOLIC BLOOD PRESSURE: 72 MMHG | HEIGHT: 65 IN | HEART RATE: 72 BPM | WEIGHT: 187.5 LBS | RESPIRATION RATE: 16 BRPM

## 2021-07-02 DIAGNOSIS — N83.291 COMPLEX CYST OF BOTH OVARIES: ICD-10-CM

## 2021-07-02 DIAGNOSIS — Z01.818 PREOP GENERAL PHYSICAL EXAM: Primary | ICD-10-CM

## 2021-07-02 DIAGNOSIS — Z72.0 TOBACCO ABUSE: ICD-10-CM

## 2021-07-02 DIAGNOSIS — R10.31 RLQ ABDOMINAL PAIN: ICD-10-CM

## 2021-07-02 DIAGNOSIS — G89.29 CHRONIC PELVIC PAIN IN FEMALE: ICD-10-CM

## 2021-07-02 DIAGNOSIS — N83.292 COMPLEX CYST OF BOTH OVARIES: ICD-10-CM

## 2021-07-02 DIAGNOSIS — R10.2 CHRONIC PELVIC PAIN IN FEMALE: ICD-10-CM

## 2021-07-02 LAB
ANION GAP SERPL CALCULATED.3IONS-SCNC: 6 MMOL/L (ref 3–14)
BUN SERPL-MCNC: 15 MG/DL (ref 7–30)
CALCIUM SERPL-MCNC: 9.4 MG/DL (ref 8.5–10.1)
CHLORIDE SERPL-SCNC: 106 MMOL/L (ref 94–109)
CO2 SERPL-SCNC: 27 MMOL/L (ref 20–32)
CREAT SERPL-MCNC: 0.78 MG/DL (ref 0.52–1.04)
GFR SERPL CREATININE-BSD FRML MDRD: >90 ML/MIN/{1.73_M2}
GLUCOSE SERPL-MCNC: 98 MG/DL (ref 70–99)
HGB BLD-MCNC: 14.7 G/DL (ref 11.7–15.7)
POTASSIUM SERPL-SCNC: 4.2 MMOL/L (ref 3.4–5.3)
SODIUM SERPL-SCNC: 139 MMOL/L (ref 133–144)

## 2021-07-02 PROCEDURE — 80048 BASIC METABOLIC PNL TOTAL CA: CPT | Performed by: PHYSICIAN ASSISTANT

## 2021-07-02 PROCEDURE — 36415 COLL VENOUS BLD VENIPUNCTURE: CPT | Performed by: PHYSICIAN ASSISTANT

## 2021-07-02 PROCEDURE — 85018 HEMOGLOBIN: CPT | Performed by: PHYSICIAN ASSISTANT

## 2021-07-02 PROCEDURE — 99214 OFFICE O/P EST MOD 30 MIN: CPT | Performed by: PHYSICIAN ASSISTANT

## 2021-07-02 RX ORDER — ESCITALOPRAM OXALATE 20 MG/1
20 TABLET ORAL DAILY
COMMUNITY
End: 2023-07-21

## 2021-07-02 RX ORDER — LORATADINE 10 MG/1
10 TABLET ORAL DAILY
COMMUNITY

## 2021-07-02 ASSESSMENT — PAIN SCALES - GENERAL: PAINLEVEL: MODERATE PAIN (4)

## 2021-07-02 ASSESSMENT — MIFFLIN-ST. JEOR: SCORE: 1516.37

## 2021-07-02 NOTE — PROGRESS NOTES
[facility]  [id number] [address]  Phone: [phone]  Primary Provider: Dariela Todd  Pre-op Performing Provider: DARIELA TODD      PREOPERATIVE EVALUATION:  Today's date: 7/2/2021    Charity Duarte is a 40 year old female who presents for a preoperative evaluation.    Surgical Information:  Surgery/Procedure: Laparoscopic Bilateral Salpingectomy   Surgery Location: [facility]   Surgeon: Dr. Jeffrey Bansal  Surgery Date: 08/03/2021  Time of Surgery: 11:00  Where patient plans to recover: At home with family  Fax number for surgical facility: Note does not need to be faxed, will be available electronically in Epic.    Type of Anesthesia Anticipated: General    Assessment & Plan     The proposed surgical procedure is considered INTERMEDIATE risk.    Preop general physical exam  Chronic pelvic pain in female  Complex cyst of both ovaries  RLQ abdominal pain  Ok with planned procedure. Hopefully this does alleviate her chronic RLQ pain  - Basic metabolic panel  - Hemoglobin    Tobacco abuse  Discussed importance of cessation and as much reduction as possible prior to planned procedure    Risks and Recommendations:  The patient has the following additional risks and recommendations for perioperative complications:   - No identified additional risk factors other than previously addressed    Medication HOLD times reviewed in Patient instructions    She DECLINES the COVID vaccination. She has testing 7/30/21    RECOMMENDATION:  APPROVAL GIVEN to proceed with proposed procedure, without further diagnostic evaluation.          Subjective     HPI related to upcoming procedure: Patient mentions multiple miscarriages today and that she is completely finished with having children. Additionally she has had some complex cysts noted on the ovaries and chronic RLQ pain.    Preop Questions 7/2/2021   1. Have you ever had a heart attack or stroke?  No   2. Have you ever had surgery on your heart or blood vessels, such as a stent placement, a coronary artery bypass, or surgery on an artery in your head, neck, heart, or legs? No   3. Do you have chest pain with activity? No   4. Do you have a history of  heart failure? No   5. Do you currently have a cold, bronchitis or symptoms of other infection? No   6. Do you have a cough, shortness of breath, or wheezing? No   7. Do you or anyone in your family have previous history of blood clots? UNKNOWN - none for the patient    8. Do you or does anyone in your family have a serious bleeding problem such as prolonged bleeding following surgeries or cuts? No   9. Have you ever had problems with anemia or been told to take iron pills? YES - when young; not since    10. Have you had any abnormal blood loss such as black, tarry or bloody stools, or abnormal vaginal bleeding? No   11. Have you ever had a blood transfusion? No   12. Are you willing to have a blood transfusion if it is medically needed before, during, or after your surgery? Yes   13. Have you or any of your relatives ever had problems with anesthesia? YES - Mom slow to come out of anesthesia; patient did well   14. Do you have sleep apnea, excessive snoring or daytime drowsiness? No   15. Do you have any artifical heart valves or other implanted medical devices like a pacemaker, defibrillator, or continuous glucose monitor? No   16. Do you have artificial joints? No   17. Are you allergic to latex? YES: rash   18. Is there any chance that you may be pregnant? No       Health Care Directive:  Patient does not have a Health Care Directive or Living Will: Discussed advance care planning with patient; however, patient declined at this time.    Preoperative Review of :   reviewed - no record of controlled substances prescribed.      Status of Chronic Conditions:  See problem list for active medical problems.  Problems all longstanding and stable, except as  noted/documented.  See ROS for pertinent symptoms related to these conditions.      Review of Systems  Constitutional, neuro, ENT, endocrine, pulmonary, cardiac, gastrointestinal, genitourinary, musculoskeletal, integument and psychiatric systems are negative, except as otherwise noted.    Patient Active Problem List    Diagnosis Date Noted     Chronic pelvic pain in female 06/25/2021     Priority: Medium     Complex cyst of both ovaries 06/25/2021     Priority: Medium     Request for sterilization 06/18/2021     Priority: Medium     Added automatically from request for surgery [id number]       Class 1 obesity due to excess calories without serious comorbidity with body mass index (BMI) of 31.0 to 31.9 in adult 06/16/2021     Priority: Medium     Nipple discharge in female 03/19/2012     Priority: Medium     3/2012:  Right sided nipple discharge.  Labs normal.  Cytology shows some blood.  US/Mammo notes small cyst, otherwise normal.  Surgeon consult obtained. Surgery will recheck in 6 months with US and appt.  Think discharge likely physiologic but if persists, she will contact Dr. Cisneros and further eval done.        Tobacco abuse 03/05/2012     Priority: Medium     Dysplasia of cervix, high grade CIERA 2 03/14/2008     Priority: Medium     8/10/06 ASCUS pap  9/4/07 LSIL, suggestive of HSIL  9/28/07 [place] - Scant fragments of unremarkable cervical mucosa  3/14/08 LEEP Bx: CIERA 2, margins neg for dysplasia  2/2/09 NIL pap  7/8/10 NIL pap  [Above per Care Everywhere]   6/21/13 NIL pap, neg HR HPV  11/21/14 NIL pap, neg HR HPV (in Care Everywhere)   6/16/21 NIL pap, neg HR HPV. 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     ENT SURGERY  2009    wisdom tooth extraction     Current Outpatient Medications   Medication  "Sig Dispense Refill     Acetaminophen (TYLENOL PO)        albuterol (PROAIR HFA/PROVENTIL HFA/VENTOLIN HFA) 108 (90 Base) MCG/ACT inhaler Inhale 2 puffs into the lungs every 6 hours as needed for shortness of breath / dyspnea or wheezing 1 Inhaler 0     busPIRone (BUSPAR) 15 MG tablet Take 7.5 mg by mouth 2 times daily        cetirizine (ZYRTEC) 10 MG tablet Take 1 tablet (10 mg) by mouth every evening 90 tablet 3     Chlorpheniramine Maleate (ALLERGY PO)        ESCITALOPRAM OXALATE PO Take 45 mg by mouth daily        fluticasone (FLONASE) 50 MCG/ACT spray 2 SPRAYS IN EACH NOSTRILS ONCE DAILY  3     gabapentin (NEURONTIN) 100 MG capsule TAKE 2 CAPSULES BY MOUTH IN THE MORNING, 2 CAPSULES IN THE AFTERNOON, AND 3 CAPSULES AT NIGHT 210 capsule 3     IBUPROFEN PO        Naproxen Sodium (ALEVE PO)          Allergies   Allergen Reactions     Bees Anaphylaxis     Marijuana [Cannabis] Anaphylaxis     Mold Cough     Pollen Extract Cough     Seasonal Allergies      Latex Swelling and Rash        Social History     Tobacco Use     Smoking status: Current Every Day Smoker     Packs/day: 1.00     Types: Cigarettes     Smokeless tobacco: Never Used     Tobacco comment: amount varies to stress   Substance Use Topics     Alcohol use: Yes     Comment: rare     History   Drug Use No         Objective     /72 (BP Location: Right arm, Cuff Size: Adult Regular)   Pulse 72   Temp 98.1  F (36.7  C) (Oral)   Resp 16   Ht 1.651 m (5' 5\")   Wt 85 kg (187 lb 8 oz)   SpO2 97%   BMI 31.20 kg/m      Physical Exam    GENERAL APPEARANCE: healthy, alert and no distress     EYES: EOMI,  PERRL     HENT: ear canals and TM's normal and nose and mouth without ulcers or lesions     RESP: lungs clear to auscultation - no rales, rhonchi or wheezes     CV: regular rates and rhythm     ABDOMEN: diffuse ttp, worst in RLQ     MS: No peripheral edema      SKIN: no suspicious lesions or rashes     PSYCH: mentation appears normal. and affect " normal/bright.     Diagnostics:  Recent Results (from the past 24 hour(s))   Basic metabolic panel    Collection Time: 07/02/21 10:20 AM   Result Value Ref Range    Sodium 139 133 - 144 mmol/L    Potassium 4.2 3.4 - 5.3 mmol/L    Chloride 106 94 - 109 mmol/L    Carbon Dioxide 27 20 - 32 mmol/L    Anion Gap 6 3 - 14 mmol/L    Glucose 98 70 - 99 mg/dL    Urea Nitrogen 15 7 - 30 mg/dL    Creatinine 0.78 0.52 - 1.04 mg/dL    GFR Estimate >90 >60 mL/min/[1.73_m2]    GFR Estimate If Black >90 >60 mL/min/[1.73_m2]    Calcium 9.4 8.5 - 10.1 mg/dL   Hemoglobin    Collection Time: 07/02/21 10:20 AM   Result Value Ref Range    Hemoglobin 14.7 11.7 - 15.7 g/dL      No EKG required, no history of coronary heart disease, significant arrhythmia, peripheral arterial disease or other structural heart disease.    Revised Cardiac Risk Index (RCRI):  The patient has the following serious cardiovascular risks for perioperative complications:   - No serious cardiac risks = 0 points     RCRI Interpretation: 0 points: Class I (very low risk - 0.4% complication rate)         Signed Electronically by: Jaime Sanchez PA-C  Copy of this evaluation report is provided to requesting physician.

## 2021-07-02 NOTE — PATIENT INSTRUCTIONS
Starting one week prior to surgery, STOP all nsaids (aleve, ibuprofen, motrin, advil); ONLY use tylenol for pain    Stop all herbals/vitamins/minerals one week out from surgery as well    Ok to take the lexapro, buspar and allergy medication on the day of surgery with a small sip of water      Preparing for Your Surgery  Getting started  A nurse will call you to review your health history and instructions. They will give you an arrival time based on your scheduled surgery time.  Please be ready to share the following:    Your doctor's clinic name and phone number    Your medical, surgical and anesthesia history    A list of allergies and sensitivities    A list of medicines, including herbal treatments and over-the-counter drugs    Whether the patient has a legal guardian (ask how to send us the papers in advance)  If you have a child who's having surgery, please ask for a copy of Preparing for Your Child's Surgery.    Preparing for surgery    Within 30 days of surgery: Have a pre-op exam (sometimes called an H&P, or History and Physical). This can be done at a clinic or pre-operative center.  ? If you're having a , you may not need this exam. Talk to your care team    At your pre-op exam, talk to your care team about all medicines you take. If you need to stop any medicines before surgery, ask when to start taking them again.  ? We do this for your safety. Many medicines can make you bleed too much during surgery. Some change how well surgery (anesthesia) drugs work.    Call your insurance company to let them know you're having surgery. (If you don't have insurance, call [phone].)    Call your clinic if there's any change in your health. This includes signs of a cold or flu (sore throat, runny nose, cough, rash, fever). It also includes a scrape or scratch near the surgery site.    If you have questions on the day of surgery, call your hospital or surgery center.  Eating and drinking guidelines  For  your safety: Unless your surgeon tells you otherwise, follow the guidelines below.    Eat and drink as usual until 8 hours before surgery. After that, no food or milk.    Drink clear liquids until 2 hours before surgery. These are liquids you can see through, like water, Gatorade and Propel Water. You may also have black coffee and tea (no cream or milk).    Nothing by mouth within 2 hours of surgery. This includes gum, candy and breath mints.    If you drink, stop drinking alcohol the night before surgery.    If your care team tells you to take medicine on the morning of surgery, it's okay to take it with a sip of water.  Preventing infection    Shower or bathe the night before and morning of your surgery. Follow the instructions your clinic gave you. (If no instructions, use regular soap.)    Don't shave or clip hair near your surgery site. We'll remove the hair if needed.    Don't smoke or vape the morning of surgery. You may chew nicotine gum up to 2 hours before surgery. A nicotine patch is okay.  ? Note: Some surgeries require you to completely quit smoking and nicotine. Check with your surgeon.    Your care team will make every effort to keep you safe from infection. We will:  ? Clean our hands often with soap and water (or an alcohol-based hand rub).  ? Clean the skin at your surgery site with a special soap that kills germs.  ? Give you a special gown to keep you warm. (Cold raises the risk of infection.)  ? Wear special hair covers, masks, gowns and gloves during surgery.  ? Give antibiotic medicine, if prescribed. Not all surgeries need antibiotics.  What to bring on the day of surgery    Photo ID and insurance card    Copy of your health care directive, if you have one    Glasses and hearing aides (bring cases)  ? You can't wear contacts during surgery    Inhaler and eye drops, if you use them (tell us about these when you arrive)    CPAP machine or breathing device, if you use them    A few personal  items, if spending the night    If you have . . .  ? A pacemaker or ICD (cardiac defibrillator): Bring the ID card.  ? An implanted stimulator: Bring the remote control.  ? A legal guardian: Bring a copy of the certified (court-stamped) guardianship papers.  Please remove any jewelry, including body piercings. Leave jewelry and other valuables at home.  If you're going home the day of surgery  Important: If you don't follow the rules below, we must cancel your surgery.     Arrange for someone to drive you home after surgery. You may not drive, take a taxi or take public transportation by yourself (unless you'll have local anesthesia only).    Arrange for a responsible adult to stay with you overnight. If you don't, we may keep you in the hospital overnight, and you may need to pay the costs yourself.  Questions?   If you have any questions for your care team, list them here: _________________________________________________________________________________________________________________________________________________________________________________________________________________________________________________________________________________________________________________________  For informational purposes only. Not to replace the advice of your health care provider. Copyright   2003, 2019 [facility]. All rights reserved. Clinically reviewed by Pooja Winter MD. [employer] [id number] - REV 4/20.

## 2021-07-06 ENCOUNTER — HOSPITAL ENCOUNTER (OUTPATIENT)
Dept: ULTRASOUND IMAGING | Facility: CLINIC | Age: 41
Discharge: HOME | End: 2021-07-06
Attending: OBSTETRICS & GYNECOLOGY | Admitting: OBSTETRICS & GYNECOLOGY
Payer: COMMERCIAL

## 2021-07-06 DIAGNOSIS — R93.89 ENDOMETRIAL THICKENING ON ULTRASOUND: ICD-10-CM

## 2021-07-06 PROCEDURE — 76830 TRANSVAGINAL US NON-OB: CPT

## 2021-07-07 ENCOUNTER — TELEPHONE (OUTPATIENT)
Dept: OBGYN | Facility: CLINIC | Age: 41
End: 2021-07-07

## 2021-07-07 DIAGNOSIS — R93.89 THICKENED ENDOMETRIUM: ICD-10-CM

## 2021-07-07 DIAGNOSIS — R10.2 CHRONIC PELVIC PAIN IN FEMALE: ICD-10-CM

## 2021-07-07 DIAGNOSIS — G89.29 CHRONIC PELVIC PAIN IN FEMALE: ICD-10-CM

## 2021-07-07 DIAGNOSIS — Z30.2 ENCOUNTER FOR STERILIZATION: Primary | ICD-10-CM

## 2021-07-07 DIAGNOSIS — N83.292 COMPLEX CYST OF LEFT OVARY: ICD-10-CM

## 2021-07-07 PROBLEM — N83.209 OVARIAN CYST: Status: ACTIVE | Noted: 2021-06-18

## 2021-07-07 NOTE — TELEPHONE ENCOUNTER
Procedure name(s) - multi select Laparoscopic bilateral salpingectomy, Right salpingo-oophorectomy, Possible Left ovarian cystectomy    Operative hysteroscopy with intrauterine morcellator, Dilatation & curettage   Reason for procedure Desires permanent sterilization, Chronic right pelvic pain, Left ovarian cyst, Thickened endometrium   Surgeon: Rolf   Is this a multi surgeon case? Yes   Comments (who/departments/details) Need another Gyn MD   Laterality Other (See Comments)   Comment: Bilateral tubes, Right ovary, Left ovarian cyst   Request for additional equipment Other (see comments)   Comment: Myosure   Anesthesia General   Initiate Pre-op orders for above procedure: Yes, as ordered in Epic   Comment: Additional orders noted there also   Location of Case: [facility]   PA Assistant: No   Operating room  requested: No   Urgency of Surgery: Routine   Surgeon Procedure Time (incision to closure) in minutes (per procedure as applicable) 90   Note:  Surgical Case Time Needed (in minutes)   Patient Class (for admit prior to surgery, specify number of days in comments): Same day (hospital outpatient)   Why can t this outpatient surgery be done at the [facility] or  ASC? Not applicable   H&P To Be Completed By: PCP   Post-Op Appointment None   Vendor Needed? No

## 2021-07-07 NOTE — TELEPHONE ENCOUNTER
I spoke w/ Pt:  Had long d/w Pt reviewing her pelvic U/S from 2021 showing her uterus has again a thickened endometrium, and her right ovary is normal, her left ovary has a persistent complex cyst c/w an endometrioma.  Pt states her menses are regular, but she has chronic RLQ and pelvic pain probably due to endometriosis.  She has tried OCPs in the past, but this causes side effects that she couldn't tolerate.  She asked if a hysterectomy/BSO would be an option, but I explained that her endometrium would still need evaluation before that to confirm it is benign since she has had 2 U/S's showing thickened endometrium. She is inclined to want this, but since it will likely push her surgery out further, she is okay with going forward with her surgery as is, but will also add an Op Hyst w/ IUM, D&C to confirm benign endometrial cavity.    Will forward to surgery scheduler to change her 8/3/2021 surgery to the followin) Laparoscopic Bilateral Salpingectomy, Right oophorectomy, possible left ovarian cystectomy    2) Operative hysteroscopy w/ intrauterine morcellator, Dilatation & curettage

## 2021-07-07 NOTE — TELEPHONE ENCOUNTER
Type of surgery: laparoscopic bilateral salpingectomy, right salpingo-oophorectomy, possible left ovarian cystectomy, operative hysteroscopy with intrauterine morcellator, dilation and curettage  Location of surgery: [facility]  Date and time of surgery: 8/3/21 @ 7:30 am  Surgeon: Dr. Bansal  Pre-Op Appt Date: 7/2/21  Post-Op Appt Date: none   Packet sent out: Yes  Pre-cert/Authorization completed:  No  Date: 7/7/21

## 2021-07-29 ASSESSMENT — MIFFLIN-ST. JEOR: SCORE: 1573.07

## 2021-07-30 ENCOUNTER — LAB (OUTPATIENT)
Dept: LAB | Facility: CLINIC | Age: 41
End: 2021-07-30
Attending: OBSTETRICS & GYNECOLOGY
Payer: COMMERCIAL

## 2021-07-30 DIAGNOSIS — Z11.59 ENCOUNTER FOR SCREENING FOR OTHER VIRAL DISEASES: ICD-10-CM

## 2021-07-30 PROCEDURE — U0003 INFECTIOUS AGENT DETECTION BY NUCLEIC ACID (DNA OR RNA); SEVERE ACUTE RESPIRATORY SYNDROME CORONAVIRUS 2 (SARS-COV-2) (CORONAVIRUS DISEASE [COVID-19]), AMPLIFIED PROBE TECHNIQUE, MAKING USE OF HIGH THROUGHPUT TECHNOLOGIES AS DESCRIBED BY CMS-2020-01-R: HCPCS

## 2021-07-30 PROCEDURE — U0005 INFEC AGEN DETEC AMPLI PROBE: HCPCS

## 2021-07-31 LAB — SARS-COV-2 RNA RESP QL NAA+PROBE: NEGATIVE

## 2021-08-03 ENCOUNTER — ANESTHESIA EVENT (OUTPATIENT)
Dept: SURGERY | Facility: CLINIC | Age: 41
End: 2021-08-03
Payer: COMMERCIAL

## 2021-08-03 ENCOUNTER — HOSPITAL ENCOUNTER (OUTPATIENT)
Facility: CLINIC | Age: 41
Discharge: ANOTHER HOSPITAL | End: 2021-08-03
Attending: OBSTETRICS & GYNECOLOGY | Admitting: OBSTETRICS & GYNECOLOGY
Payer: COMMERCIAL

## 2021-08-03 ENCOUNTER — ANESTHESIA (OUTPATIENT)
Dept: SURGERY | Facility: CLINIC | Age: 41
End: 2021-08-03
Payer: COMMERCIAL

## 2021-08-03 VITALS
OXYGEN SATURATION: 98 % | WEIGHT: 186 LBS | HEART RATE: 64 BPM | BODY MASS INDEX: 30.99 KG/M2 | TEMPERATURE: 97.6 F | RESPIRATION RATE: 14 BRPM | DIASTOLIC BLOOD PRESSURE: 66 MMHG | HEIGHT: 65 IN | SYSTOLIC BLOOD PRESSURE: 95 MMHG

## 2021-08-03 DIAGNOSIS — R93.89 THICKENED ENDOMETRIUM: ICD-10-CM

## 2021-08-03 DIAGNOSIS — Z30.2 REQUEST FOR STERILIZATION: ICD-10-CM

## 2021-08-03 DIAGNOSIS — R10.2 CHRONIC PELVIC PAIN IN FEMALE: Primary | ICD-10-CM

## 2021-08-03 DIAGNOSIS — N83.291 COMPLEX CYST OF BOTH OVARIES: ICD-10-CM

## 2021-08-03 DIAGNOSIS — N83.209 OVARIAN CYST: ICD-10-CM

## 2021-08-03 DIAGNOSIS — G89.18 POSTOPERATIVE PAIN: ICD-10-CM

## 2021-08-03 DIAGNOSIS — G89.29 CHRONIC PELVIC PAIN IN FEMALE: Primary | ICD-10-CM

## 2021-08-03 DIAGNOSIS — N83.292 COMPLEX CYST OF BOTH OVARIES: ICD-10-CM

## 2021-08-03 LAB — HCG UR QL: NEGATIVE

## 2021-08-03 PROCEDURE — 710N000009 HC RECOVERY PHASE 1, LEVEL 1, PER MIN: Performed by: OBSTETRICS & GYNECOLOGY

## 2021-08-03 PROCEDURE — 58558 HYSTEROSCOPY BIOPSY: CPT | Mod: 51 | Performed by: OBSTETRICS & GYNECOLOGY

## 2021-08-03 PROCEDURE — 999N000141 HC STATISTIC PRE-PROCEDURE NURSING ASSESSMENT: Performed by: OBSTETRICS & GYNECOLOGY

## 2021-08-03 PROCEDURE — 58661 LAPAROSCOPY REMOVE ADNEXA: CPT | Performed by: OBSTETRICS & GYNECOLOGY

## 2021-08-03 PROCEDURE — 370N000017 HC ANESTHESIA TECHNICAL FEE, PER MIN: Performed by: OBSTETRICS & GYNECOLOGY

## 2021-08-03 PROCEDURE — 250N000009 HC RX 250: Performed by: NURSE ANESTHETIST, CERTIFIED REGISTERED

## 2021-08-03 PROCEDURE — 250N000011 HC RX IP 250 OP 636: Performed by: OBSTETRICS & GYNECOLOGY

## 2021-08-03 PROCEDURE — 250N000013 HC RX MED GY IP 250 OP 250 PS 637: Performed by: OBSTETRICS & GYNECOLOGY

## 2021-08-03 PROCEDURE — 360N000076 HC SURGERY LEVEL 3, PER MIN: Performed by: OBSTETRICS & GYNECOLOGY

## 2021-08-03 PROCEDURE — 710N000012 HC RECOVERY PHASE 2, PER MINUTE: Performed by: OBSTETRICS & GYNECOLOGY

## 2021-08-03 PROCEDURE — 58661 LAPAROSCOPY REMOVE ADNEXA: CPT | Mod: 80 | Performed by: OBSTETRICS & GYNECOLOGY

## 2021-08-03 PROCEDURE — 250N000011 HC RX IP 250 OP 636: Performed by: ANESTHESIOLOGY

## 2021-08-03 PROCEDURE — 250N000009 HC RX 250: Performed by: ANESTHESIOLOGY

## 2021-08-03 PROCEDURE — 272N000001 HC OR GENERAL SUPPLY STERILE: Performed by: OBSTETRICS & GYNECOLOGY

## 2021-08-03 PROCEDURE — 258N000003 HC RX IP 258 OP 636: Performed by: ANESTHESIOLOGY

## 2021-08-03 PROCEDURE — 88305 TISSUE EXAM BY PATHOLOGIST: CPT | Mod: TC | Performed by: OBSTETRICS & GYNECOLOGY

## 2021-08-03 PROCEDURE — 250N000009 HC RX 250: Performed by: OBSTETRICS & GYNECOLOGY

## 2021-08-03 PROCEDURE — 250N000013 HC RX MED GY IP 250 OP 250 PS 637: Performed by: ANESTHESIOLOGY

## 2021-08-03 PROCEDURE — 81025 URINE PREGNANCY TEST: CPT | Performed by: OBSTETRICS & GYNECOLOGY

## 2021-08-03 PROCEDURE — 250N000011 HC RX IP 250 OP 636: Performed by: NURSE ANESTHETIST, CERTIFIED REGISTERED

## 2021-08-03 RX ORDER — LABETALOL HYDROCHLORIDE 5 MG/ML
10 INJECTION, SOLUTION INTRAVENOUS EVERY 10 MIN PRN
Status: DISCONTINUED | OUTPATIENT
Start: 2021-08-03 | End: 2021-08-03 | Stop reason: HOSPADM

## 2021-08-03 RX ORDER — LIDOCAINE HYDROCHLORIDE 10 MG/ML
INJECTION, SOLUTION INFILTRATION; PERINEURAL PRN
Status: DISCONTINUED | OUTPATIENT
Start: 2021-08-03 | End: 2021-08-03

## 2021-08-03 RX ORDER — CEFAZOLIN SODIUM 2 G/100ML
2 INJECTION, SOLUTION INTRAVENOUS
Status: COMPLETED | OUTPATIENT
Start: 2021-08-03 | End: 2021-08-03

## 2021-08-03 RX ORDER — IBUPROFEN 600 MG/1
600 TABLET, FILM COATED ORAL EVERY 6 HOURS PRN
Qty: 30 TABLET | Refills: 0 | Status: ON HOLD | OUTPATIENT
Start: 2021-08-03 | End: 2023-08-02

## 2021-08-03 RX ORDER — SCOLOPAMINE TRANSDERMAL SYSTEM 1 MG/1
1 PATCH, EXTENDED RELEASE TRANSDERMAL
Status: DISCONTINUED | OUTPATIENT
Start: 2021-08-03 | End: 2021-08-03 | Stop reason: HOSPADM

## 2021-08-03 RX ORDER — FENTANYL CITRATE 50 UG/ML
50 INJECTION, SOLUTION INTRAMUSCULAR; INTRAVENOUS
Status: DISCONTINUED | OUTPATIENT
Start: 2021-08-03 | End: 2021-08-03 | Stop reason: HOSPADM

## 2021-08-03 RX ORDER — SODIUM CHLORIDE, SODIUM LACTATE, POTASSIUM CHLORIDE, CALCIUM CHLORIDE 600; 310; 30; 20 MG/100ML; MG/100ML; MG/100ML; MG/100ML
INJECTION, SOLUTION INTRAVENOUS CONTINUOUS
Status: DISCONTINUED | OUTPATIENT
Start: 2021-08-03 | End: 2021-08-03 | Stop reason: HOSPADM

## 2021-08-03 RX ORDER — BUPIVACAINE HYDROCHLORIDE 5 MG/ML
INJECTION, SOLUTION EPIDURAL; INTRACAUDAL PRN
Status: DISCONTINUED | OUTPATIENT
Start: 2021-08-03 | End: 2021-08-03 | Stop reason: HOSPADM

## 2021-08-03 RX ORDER — GLYCOPYRROLATE 0.2 MG/ML
INJECTION, SOLUTION INTRAMUSCULAR; INTRAVENOUS PRN
Status: DISCONTINUED | OUTPATIENT
Start: 2021-08-03 | End: 2021-08-03

## 2021-08-03 RX ORDER — MEPERIDINE HYDROCHLORIDE 25 MG/ML
12.5 INJECTION INTRAMUSCULAR; INTRAVENOUS; SUBCUTANEOUS
Status: DISCONTINUED | OUTPATIENT
Start: 2021-08-03 | End: 2021-08-03 | Stop reason: HOSPADM

## 2021-08-03 RX ORDER — ONDANSETRON 2 MG/ML
4 INJECTION INTRAMUSCULAR; INTRAVENOUS EVERY 30 MIN PRN
Status: DISCONTINUED | OUTPATIENT
Start: 2021-08-03 | End: 2021-08-03 | Stop reason: HOSPADM

## 2021-08-03 RX ORDER — LIDOCAINE 40 MG/G
CREAM TOPICAL
Status: DISCONTINUED | OUTPATIENT
Start: 2021-08-03 | End: 2021-08-03 | Stop reason: HOSPADM

## 2021-08-03 RX ORDER — ACETAMINOPHEN 325 MG/1
975 TABLET ORAL ONCE
Status: COMPLETED | OUTPATIENT
Start: 2021-08-03 | End: 2021-08-03

## 2021-08-03 RX ORDER — ONDANSETRON 2 MG/ML
INJECTION INTRAMUSCULAR; INTRAVENOUS PRN
Status: DISCONTINUED | OUTPATIENT
Start: 2021-08-03 | End: 2021-08-03

## 2021-08-03 RX ORDER — OXYCODONE HYDROCHLORIDE 5 MG/1
5 TABLET ORAL EVERY 6 HOURS PRN
Qty: 10 TABLET | Refills: 0 | Status: SHIPPED | OUTPATIENT
Start: 2021-08-03 | End: 2021-08-06

## 2021-08-03 RX ORDER — PROPOFOL 10 MG/ML
INJECTION, EMULSION INTRAVENOUS CONTINUOUS PRN
Status: DISCONTINUED | OUTPATIENT
Start: 2021-08-03 | End: 2021-08-03

## 2021-08-03 RX ORDER — DEXAMETHASONE SODIUM PHOSPHATE 10 MG/ML
INJECTION, SOLUTION INTRAMUSCULAR; INTRAVENOUS PRN
Status: DISCONTINUED | OUTPATIENT
Start: 2021-08-03 | End: 2021-08-03

## 2021-08-03 RX ORDER — ONDANSETRON 4 MG/1
4 TABLET, ORALLY DISINTEGRATING ORAL EVERY 30 MIN PRN
Status: DISCONTINUED | OUTPATIENT
Start: 2021-08-03 | End: 2021-08-03 | Stop reason: HOSPADM

## 2021-08-03 RX ORDER — NEOSTIGMINE METHYLSULFATE 1 MG/ML
VIAL (ML) INJECTION PRN
Status: DISCONTINUED | OUTPATIENT
Start: 2021-08-03 | End: 2021-08-03

## 2021-08-03 RX ORDER — PROPOFOL 10 MG/ML
INJECTION, EMULSION INTRAVENOUS PRN
Status: DISCONTINUED | OUTPATIENT
Start: 2021-08-03 | End: 2021-08-03

## 2021-08-03 RX ORDER — CEFAZOLIN SODIUM 2 G/100ML
2 INJECTION, SOLUTION INTRAVENOUS SEE ADMIN INSTRUCTIONS
Status: DISCONTINUED | OUTPATIENT
Start: 2021-08-03 | End: 2021-08-03 | Stop reason: HOSPADM

## 2021-08-03 RX ORDER — KETOROLAC TROMETHAMINE 15 MG/ML
15 INJECTION, SOLUTION INTRAMUSCULAR; INTRAVENOUS ONCE
Status: COMPLETED | OUTPATIENT
Start: 2021-08-03 | End: 2021-08-03

## 2021-08-03 RX ORDER — HYDROMORPHONE HCL IN WATER/PF 6 MG/30 ML
0.2 PATIENT CONTROLLED ANALGESIA SYRINGE INTRAVENOUS EVERY 5 MIN PRN
Status: DISCONTINUED | OUTPATIENT
Start: 2021-08-03 | End: 2021-08-03 | Stop reason: HOSPADM

## 2021-08-03 RX ORDER — OXYCODONE HYDROCHLORIDE 5 MG/1
5 TABLET ORAL EVERY 4 HOURS PRN
Status: DISCONTINUED | OUTPATIENT
Start: 2021-08-03 | End: 2021-08-03 | Stop reason: HOSPADM

## 2021-08-03 RX ORDER — FENTANYL CITRATE 50 UG/ML
25 INJECTION, SOLUTION INTRAMUSCULAR; INTRAVENOUS EVERY 5 MIN PRN
Status: DISCONTINUED | OUTPATIENT
Start: 2021-08-03 | End: 2021-08-03 | Stop reason: HOSPADM

## 2021-08-03 RX ORDER — ALBUTEROL SULFATE 0.83 MG/ML
2.5 SOLUTION RESPIRATORY (INHALATION) EVERY 4 HOURS PRN
Status: DISCONTINUED | OUTPATIENT
Start: 2021-08-03 | End: 2021-08-03 | Stop reason: HOSPADM

## 2021-08-03 RX ORDER — FENTANYL CITRATE 50 UG/ML
INJECTION, SOLUTION INTRAMUSCULAR; INTRAVENOUS PRN
Status: DISCONTINUED | OUTPATIENT
Start: 2021-08-03 | End: 2021-08-03

## 2021-08-03 RX ADMIN — FENTANYL CITRATE 25 MCG: 50 INJECTION, SOLUTION INTRAMUSCULAR; INTRAVENOUS at 09:37

## 2021-08-03 RX ADMIN — SCOPALAMINE 1 PATCH: 1 PATCH, EXTENDED RELEASE TRANSDERMAL at 07:01

## 2021-08-03 RX ADMIN — LIDOCAINE HYDROCHLORIDE 50 MG: 10 INJECTION, SOLUTION INFILTRATION; PERINEURAL at 07:47

## 2021-08-03 RX ADMIN — FENTANYL CITRATE 25 MCG: 50 INJECTION, SOLUTION INTRAMUSCULAR; INTRAVENOUS at 10:27

## 2021-08-03 RX ADMIN — HYDROMORPHONE HYDROCHLORIDE 0.2 MG: 0.2 INJECTION, SOLUTION INTRAMUSCULAR; INTRAVENOUS; SUBCUTANEOUS at 10:42

## 2021-08-03 RX ADMIN — DEXAMETHASONE SODIUM PHOSPHATE 4 MG: 10 INJECTION, SOLUTION INTRAMUSCULAR; INTRAVENOUS at 07:48

## 2021-08-03 RX ADMIN — SODIUM CHLORIDE, POTASSIUM CHLORIDE, SODIUM LACTATE AND CALCIUM CHLORIDE: 600; 310; 30; 20 INJECTION, SOLUTION INTRAVENOUS at 08:47

## 2021-08-03 RX ADMIN — CEFAZOLIN SODIUM 2 G: 2 INJECTION, SOLUTION INTRAVENOUS at 07:40

## 2021-08-03 RX ADMIN — FENTANYL CITRATE 25 MCG: 50 INJECTION, SOLUTION INTRAMUSCULAR; INTRAVENOUS at 09:32

## 2021-08-03 RX ADMIN — HYDROMORPHONE HYDROCHLORIDE 0.2 MG: 0.2 INJECTION, SOLUTION INTRAMUSCULAR; INTRAVENOUS; SUBCUTANEOUS at 09:41

## 2021-08-03 RX ADMIN — PROPOFOL 25 MCG/KG/MIN: 10 INJECTION, EMULSION INTRAVENOUS at 07:50

## 2021-08-03 RX ADMIN — OXYCODONE HYDROCHLORIDE 5 MG: 5 TABLET ORAL at 10:39

## 2021-08-03 RX ADMIN — NEOSTIGMINE METHYLSULFATE 4 MG: 1 INJECTION, SOLUTION INTRAVENOUS at 08:47

## 2021-08-03 RX ADMIN — SODIUM CHLORIDE, POTASSIUM CHLORIDE, SODIUM LACTATE AND CALCIUM CHLORIDE: 600; 310; 30; 20 INJECTION, SOLUTION INTRAVENOUS at 07:40

## 2021-08-03 RX ADMIN — GLYCOPYRROLATE 0.8 MG: 0.2 INJECTION, SOLUTION INTRAMUSCULAR; INTRAVENOUS at 08:47

## 2021-08-03 RX ADMIN — FENTANYL CITRATE 25 MCG: 50 INJECTION, SOLUTION INTRAMUSCULAR; INTRAVENOUS at 10:32

## 2021-08-03 RX ADMIN — PROPOFOL 200 MG: 10 INJECTION, EMULSION INTRAVENOUS at 07:47

## 2021-08-03 RX ADMIN — ONDANSETRON HYDROCHLORIDE 4 MG: 2 INJECTION, SOLUTION INTRAVENOUS at 08:39

## 2021-08-03 RX ADMIN — FENTANYL CITRATE 50 MCG: 50 INJECTION, SOLUTION INTRAMUSCULAR; INTRAVENOUS at 07:47

## 2021-08-03 RX ADMIN — MIDAZOLAM 2 MG: 1 INJECTION INTRAMUSCULAR; INTRAVENOUS at 07:40

## 2021-08-03 RX ADMIN — ACETAMINOPHEN 975 MG: 325 TABLET, FILM COATED ORAL at 06:58

## 2021-08-03 RX ADMIN — KETOROLAC TROMETHAMINE 15 MG: 15 INJECTION, SOLUTION INTRAMUSCULAR; INTRAVENOUS at 09:40

## 2021-08-03 RX ADMIN — HYDROMORPHONE HYDROCHLORIDE 0.2 MG: 0.2 INJECTION, SOLUTION INTRAMUSCULAR; INTRAVENOUS; SUBCUTANEOUS at 09:32

## 2021-08-03 RX ADMIN — ROCURONIUM BROMIDE 50 MG: 10 INJECTION INTRAVENOUS at 07:48

## 2021-08-03 ASSESSMENT — MIFFLIN-ST. JEOR: SCORE: 1509.57

## 2021-08-03 NOTE — DISCHARGE INSTRUCTIONS
GENERAL ANESTHESIA OR SEDATION ADULT DISCHARGE INSTRUCTIONS   SPECIAL PRECAUTIONS FOR 24 HOURS AFTER SURGERY    IT IS NOT UNUSUAL TO FEEL LIGHT-HEADED OR FAINT, UP TO 24 HOURS AFTER SURGERY OR WHILE TAKING PAIN MEDICATION.  IF YOU HAVE THESE SYMPTOMS; SIT FOR A FEW MINUTES BEFORE STANDING AND HAVE SOMEONE ASSIST YOU WHEN YOU GET UP TO WALK OR USE THE BATHROOM.    YOU SHOULD REST AND RELAX FOR THE NEXT 24 HOURS AND YOU MUST MAKE ARRANGEMENTS TO HAVE SOMEONE STAY WITH YOU FOR AT LEAST 24 HOURS AFTER YOUR DISCHARGE.  AVOID HAZARDOUS AND STRENUOUS ACTIVITIES.  DO NOT MAKE IMPORTANT DECISIONS FOR 24 HOURS.    DO NOT DRIVE ANY VEHICLE OR OPERATE MECHANICAL EQUIPMENT FOR 24 HOURS FOLLOWING THE END OF YOUR SURGERY.  EVEN THOUGH YOU MAY FEEL NORMAL, YOUR REACTIONS MAY BE AFFECTED BY THE MEDICATION YOU HAVE RECEIVED.    DO NOT DRINK ALCOHOLIC BEVERAGES FOR 24 HOURS FOLLOWING YOUR SURGERY.    DRINK CLEAR LIQUIDS (APPLE JUICE, GINGER ALE, 7-UP, BROTH, ETC.).  PROGRESS TO YOUR REGULAR DIET AS YOU FEEL ABLE.    YOU MAY HAVE A DRY MOUTH, A SORE THROAT, MUSCLES ACHES OR TROUBLE SLEEPING.  THESE SHOULD GO AWAY AFTER 24 HOURS.    CALL YOUR DOCTOR FOR ANY OF THE FOLLOWING:  SIGNS OF INFECTION (FEVER, GROWING TENDERNESS AT THE SURGERY SITE, A LARGE AMOUNT OF DRAINAGE OR BLEEDING, SEVERE PAIN, FOUL-SMELLING DRAINAGE, REDNESS OR SWELLING.    IT HAS BEEN OVER 8 TO 10 HOURS SINCE SURGERY AND YOU ARE STILL NOT ABLE TO URINATE (PASS WATER).     LAPAROSCOPY, HYSTEROSCOPY OR PELVISCOPY DISCHARGE INSTRUCTIONS    PLEASE RETURN TO THE CLINIC IN:  ____1 WEEK  ____2 WEEKS  ____4 WEEKS  ____6 WEEKS  MAKE THIS APPOINTMENT AFTER YOU GET HOME IF IT HAS NOT ALREADY BEEN SCHEDULED.    DO NOT DRIVE A CAR, DRINK ALCOHOL OR USE MACHINERY FOR THE NEXT 24 HOURS.  YOU SHOULD WAIT UNTIL YOU HAVE RECOVERED BEFORE MAKING ANY IMPORTANT DECISIONS.    PAIN  YOU MAY HAVE CRAMPS, SHOULDER PAIN OR A LOW BACKACHE FOR 24 TO 48 HOURS.  TYLENOL (ACETAMINOPHEN) OR MOTRIN  (IBUPROFEN) MAY HELP, OR YOUR DOCTOR MAY GIVE YOU PAIN MEDICINE.  CALL YOUR DOCTOR IF PAIN CANNOT BE CONTROLLED.    BLEEDING OR VAGINAL DISCHARGE  YOU MAY HAVE SOME BLEEDING OR DISCHARGE FOR UP TO A WEEK OR LONGER.  DO NOT DOUCHE, USE TAMPONS OR HAVE SEX (INTERCOURSE) FOR ______DAYS.  CALL YOUR DOCTOR IF YOU SOAK MORE THAN ONE MAXI PAD (SANITARY NAPKIN) PER HOUR, OR IF YOU PASS LARGE BLOOD CLOTS.    FEVER  YOU MAY HAVE A LOW FEVER FOR THE FIRST TWO DAYS.  CALL YOUR DOCTOR IF IT GOES OVER 101 DEGREES.    NAUSEA  IF YOU HAVE NAUSEA (FEEL SICK TO YOUR STOMACH), STAY IN BED.  TRY DRINKING A SMALL AMOUNT OF 7-UP, TEA OR SOUP.    SWOLLEN BELLY  IF YOUR ABDOMEN (BELLY AREA) FEELS FIRM OR SWOLLEN, CALL YOUR DOCTOR.    DIZZINESS AND WEAKNESS  YOU MAY FEEL DIZZY OR WEAK FOR A FEW DAYS.  IF SO, YOU SHOULD REST OFTEN, STAND UP SLOWLY AND USE CARE WHEN CLIMBING STAIRS.    DIET AND ACTIVITY  EAT LIGHT MEALS AND DRINK PLENTY OF FLUIDS FOR THE FIRST 24 HOURS (OR LONGER, IF YOU HAVE NAUSEA).  WAIT 5 DAYS BEFORE BATHING.  SHOWERS ARE OKAY.  MOST WOMEN CAN RETURN TO WORK AFTER 24 HOURS.  YOU MAY GO BACK TO YOUR OTHER ACTIVITIES AFTER YOUR PAIN GOES AWAY.            IF YOU HAVE STITCHES  YOU MAY REMOVE YOUR BANDAGE THE DAY AFTER TREATMENT.  YOUR DOCTOR WILL TELL YOU IF YOUR STITCHES NEED TO BE REMOVED.  SOME STITCHES DISSOLVE OVER TIME.           DILATION AND CURETTAGE DISCHARGE INSTRUCTIONS    DR. JOSÉ MIGUEL HAYES M.D.    CLINIC PHONE NUMBER [phone].  [facility]    PLEASE RETURN TO THE CLINIC IN:  ____1 WEEK  ____2 WEEKS  ____4 WEEKS  ____6 WEEKS  MAKE THIS APPOINTMENT AFTER YOU GET HOME IF IT HAS NOT ALREADY BEEN SCHEDULED.    DO NOT DRIVE A CAR, DRINK ALCOHOL OR USE MACHINERY FOR THE NEXT 24 HOURS.  YOU SHOULD WAIT UNTIL YOU HAVE RECOVERED BEFORE MAKING ANY IMPORTANT DECISIONS.    PAIN AND DISCOMFORT  YOU MAY HAVE CRAMPS OR A LOW BACKACHE FOR 24 TO 48 HOURS.  TYLENOL (ACETAMINOPHEN) OR MOTRIN (IBUPROFEN) MAY HELP, OR YOUR  DOCTOR MAY GIVE YOU PAIN MEDICINE.  CALL YOUR DOCTOR IF PAIN CANNOT BE CONTROLLED.  YOU MAY FEEL DROWSY AND WEAK FOR A DAY OR TWO.    VAGINAL DISCHARGE  YOU MAY HAVE SOME BLEEDING OR DISCHARGE FOR UP TO TWO WEEKS.  DO NOT DOUCHE, USE TAMPONS OR HAVE SEX (INTERCOURSE) IN THE FIRST WEEK.  CALL YOUR DOCTOR IF YOU SOAK MORE THAN ONE MAXI PAD (SANITARY NAPKIN) PER HOUR, OR IF YOU PASS LARGE BLOOD CLOTS.    OTHER SYMPTOMS  YOU MAY HAVE A LOW FEVER FOR THE FIRST TWO DAYS.  CALL YOUR DOCTOR IF YOUR FEVER GOES OVER 101 DEGREES FAHRENHEIT.    IF YOU HAVE NAUSEA (FEEL SICK TO YOUR STOMACH), STAY IN BED.  TRY DRINKING A SMALL AMOUNT 7-UP, TEA OR SOUP.    DIET AND ACTIVITY  EAT LIGHT MEALS AND DRINK PLENTY OF FLUIDS FOR THE FIRST 24 HOURS (OR LONGER, IF YOU HAVE NAUSEA).    YOU MAY BATHE, SHOWER AND CLIMB STAIRS.  MOST WOMEN CAN RETURN TO WORK AFTER 24 HOURS.  YOU MAY GO BACK TO YOUR OTHER ACTIVITIES AFTER YOUR PAIN GOES AWAY.          Maximum acetaminophen (Tylenol) dose from all sources should not exceed 4 grams (4000 mg) per day.  You had tylenol last at 7am.  Do no take tylenol products until after 1pm.    Your scope patch for nausea was placed behind your ear at 7am on Tuesday, August 3; remove this in 24 hours.

## 2021-08-03 NOTE — PROGRESS NOTES
SPIRITUAL HEALTH SERVICES  [facility]  PRE-SURGERY VISIT    Pre-surgical visit with pt and spouse.  Provided spiritual support, prayer.   Oriented to Spiritual Health Services and availability for emotional/spiritual support during hospital stay.         Israel Eagle MA  Staff   Pager: [phone]  Phone: [phone]

## 2021-08-03 NOTE — ANESTHESIA PREPROCEDURE EVALUATION
Anesthesia Pre-Procedure Evaluation    Patient: Charity Duarte   MRN: [id number] : 1980        Preoperative Diagnosis: Request for sterilization [Z30.2]  Chronic pelvic pain in female [R10.2, G89.29]  Ovarian cyst [N83.209]  Thickened endometrium [R93.89]   Procedure : Procedure(s):  Laparoscopic bilateral salpingectomy, right salpingo oophorectomy  possible left ovarian cystectomy  operative hysteroscopy with intrauterine morcellator, dilation and curettage     Past Medical History:   Diagnosis Date     Anxiety state, unspecified      Arthritis      Miscarriage     multiple in past     Motion sickness       Past Surgical History:   Procedure Laterality Date     C STABISM SURG,PREV EYE SURG,NOT MUSC      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     ENT SURGERY      wisdom tooth extraction      Allergies   Allergen Reactions     Bees Anaphylaxis     Marijuana [Cannabis] Anaphylaxis     Mold Cough     Pollen Extract Cough     Seasonal Allergies      Latex Swelling and Rash      Social History     Tobacco Use     Smoking status: Current Every Day Smoker     Packs/day: 0.50     Types: Cigarettes     Smokeless tobacco: Never Used     Tobacco comment: amount varies to stress   Substance Use Topics     Alcohol use: Not Currently      Wt Readings from Last 1 Encounters:   21 84.4 kg (186 lb)        Anesthesia Evaluation   Pt has had prior anesthetic. Type: General.    No history of anesthetic complications       ROS/MED HX  ENT/Pulmonary:  - neg pulmonary ROS     Neurologic:  - neg neurologic ROS     Cardiovascular:  - neg cardiovascular ROS     METS/Exercise Tolerance:     Hematologic:  - neg hematologic  ROS     Musculoskeletal:  - neg musculoskeletal ROS     GI/Hepatic:     (+) GERD, Asymptomatic on medication,     Renal/Genitourinary:  - neg Renal ROS     Endo:     (+) type I DM, Obesity,     Psychiatric/Substance Use:     (+) psychiatric history anxiety     Infectious Disease:  - neg infectious disease ROS     Malignancy:  -  neg malignancy ROS     Other:  - neg other ROS          Physical Exam    Airway        Mallampati: I   TM distance: > 3 FB   Neck ROM: full     Respiratory Devices and Support         Dental  no notable dental history         Cardiovascular   cardiovascular exam normal          Pulmonary   pulmonary exam normal                OUTSIDE LABS:  CBC:   Lab Results   Component Value Date    WBC 9.0 08/24/2018    WBC 8.2 06/20/2013    HGB 14.7 07/02/2021    HGB 15.5 08/24/2018    HCT 45.4 08/24/2018    HCT 45.5 06/20/2013     08/24/2018     06/20/2013     BMP:   Lab Results   Component Value Date     07/02/2021     08/24/2018    POTASSIUM 4.2 07/02/2021    POTASSIUM 4.1 08/24/2018    CHLORIDE 106 07/02/2021    CHLORIDE 107 08/24/2018    CO2 27 07/02/2021    CO2 24 08/24/2018    BUN 15 07/02/2021    BUN 12 08/24/2018    CR 0.78 07/02/2021    CR 0.56 08/24/2018    GLC 98 07/02/2021    GLC 97 08/24/2018     COAGS: No results found for: PTT, INR, FIBR  POC:   Lab Results   Component Value Date    HCGS  08/03/2010     Negative   This test provides a presumptive diagnosis of pregnancy or non-pregnancy. A   confirmed pregnancy diagnosis should only be made by a physician after all   clinical and laboratory findings have been evaluated.     HEPATIC:   Lab Results   Component Value Date    ALBUMIN 4.1 08/24/2018    PROTTOTAL 7.5 08/24/2018    ALT 18 08/24/2018    AST 14 08/24/2018    ALKPHOS 78 08/24/2018    BILITOTAL 0.3 08/24/2018     OTHER:   Lab Results   Component Value Date    JACKIE 9.4 07/02/2021    TSH 1.02 08/24/2018       Anesthesia Plan    ASA Status:  1   NPO Status:  NPO Appropriate    Anesthesia Type: General.     - Airway: ETT   Induction: Intravenous, Propofol.   Maintenance: Balanced.        Consents    Anesthesia Plan(s) and associated risks, benefits, and realistic alternatives discussed. Questions answered and patient/representative(s) expressed understanding.     - Discussed with:   Patient         Postoperative Care    Pain management: IV analgesics, Oral pain medications, Multi-modal analgesia.   PONV prophylaxis: Ondansetron (or other 5HT-3), Dexamethasone or Solumedrol     Comments:                Osmin Law MD

## 2021-08-03 NOTE — ANESTHESIA PROCEDURE NOTES
Airway       Patient location during procedure: OR       Procedure Start/Stop Times: 8/3/2021 7:49 AM  Staff -        CRNA: Phoebe Vargas APRN CRNA       Performed By: CRNA  Consent for Airway        Urgency: elective  Indications and Patient Condition       Indications for airway management: milly-procedural       Induction type:intravenous       Mask difficulty assessment: 1 - vent by mask    Final Airway Details       Final airway type: endotracheal airway       Successful airway: ETT - single  Endotracheal Airway Details        ETT size (mm): 7.0       Cuffed: yes       Successful intubation technique: direct laryngoscopy       DL Blade Type: Martin 2       Grade View of Cords: 1       Adjucts: stylet       Position: Right       Measured from: gums/teeth       Secured at (cm): 22       Bite block used: None    Post intubation assessment        Placement verified by: capnometry, equal breath sounds and chest rise        Number of attempts at approach: 1       Number of other approaches attempted: 0       Secured with: plastic tape       Ease of procedure: easy       Dentition: Intact and Unchanged

## 2021-08-03 NOTE — OP NOTE
Operative Report      Preoperative Diagnosis:  Desires permanent sterilization, Chronic right pelvic pain, Possible left ovarian cyst, Thickened endometrial stripe.       Postoperative Diagnosis:  Permanent sterility, Chronic right pelvic pain, Endometriosis, Endometrial polyp       Procedure:  Laparoscopic right salpingo-oophorectomy, Left salpingectomy, Hysteroscopic endometrial polypectomy with intrauterine morcellator, Dilatation and curettage.       Surgeon:  Jeffrey Bansal MD       Assistant:  Antonio Young MD      Anesthesia:  General endotracheal anesthesia.       Fluids:  1000 cc lactated Ringer's, Ancef 2 gm and uterine distention medium was normal saline with 95 cc deficit total.       Estimated Blood Loss: 20 mL.       Drains:  None.       Indications for Procedure:  The patient is a 40-year-old woman,  19, para 2,1,17,3 who wants to undergo permanent sterilization via laparoscopic bilateral salpingectomy.  She is comfortable with her decision and has completed childbearing.  She has no desire for any future pregnancies.  She also has suffered from chronic right lower pelvic pain that she attributes to recurring ovarian cysts on that side, and had a recent pelvic ultrasound showing a possible left ovarian endometrioma vs complex cyst.  Also on the ultrasound, her endometrial stripe is abnormally thickened.  Therefore, the plan is to have her undergo a laparoscopic right salpingo-oophorectomy because she absolutely wants to have her right ovary removed even if normal, left salpingectomy, possible left ovarian cystectomy, and operative hysteroscopy with intrauterine morcellator to remove any possible anatomic intrauterine lesions as well as a dilatation & curettage to sample the endometrium.  She understands the indication for the procedures as well as alternatives including laparoscopic conventional tubal ligation with Falope rings, but she would like the added assurance of a full salpingectomy  as well as the potential other possible benefit of decreased risk of ovarian serous carcinoma in the future.  She understands the risks include bleeding, infection, injury to the bowel, bladder,  uterus, ovaries, nerves, blood vessels, intraabdominal or pelvic organs, as well as the risk of fluid overload and electrolyte imbalance that can occur in an operative hysteroscopy, and the risks of general anesthesia including aspiration, malignant hypothermia and death.  She accepts all these risks and has given informed consent.       Findings:  On pelvic exam under anesthesia, the uterus is 8-10 weeks size, slightly globular shape, normal contour, anteverted and there are no adnexal masses.  On laparoscopy, the uterus is 8-10 weeks size and globular, and both tubes and ovaries are normal.  There were no ovarian cysts on either side.  There are a few endometriosis implants in the anterior and posterior cul-de-sacs, but no adhesions seen.  The cul-de-sac is normal.       Specimens:  Both fallopian tubes, right ovary, endocervical curetting, endometrial polyp, endometrial curettings.       Procedure in Detail:  Dr. Young actively first-assisted during this operation because of the added complexity of this case presented by the potential for endometriosis and adhesions with patient's history, as well as providing necessary retraction and exposure as well as maintaining hemostasis and a clear operative field.  This was helpful in allowing the operation to proceed in a safe and expeditious manner.  Dr. Young was present for the entire duration of the operation.    After proper patient identification and consent for procedure was obtained, the patient was taken to the operating room where adequate general endotracheal anesthesia was obtained.  The patient was placed in dorsal lithotomy position with legs in Guillermo stirrups.  Pelvic exam under anesthesia was performed with above-noted findings.  She was prepped and draped in the  usual sterile manner for this procedure.  A Pappas catheter was placed.  A single-arm speculum was placed in the vagina to visualize the cervix, which was then grasped at 12:00 with a single-tooth tenaculum for downward traction of the uterus.  A Hulka tenaculum was passed through the cervix into the uterine cavity and used to grasp the cervix at 12:00 and left in place for uterine manipulation during the procedure.  The single-tooth tenaculum was removed and the speculum removed as well.       Attention was then turned to the abdomen.  A 5 mm skin incision was made with a knife in the subumbilical skinfold and then a Veress needle was passed through the incision into peritoneal cavity without difficulty.  Low flow insufflation was commenced with the initial insufflation pressure of 5 mmHg.  After 1 liter was insufflated under low flow, high flow insufflation was performed until 2.5 liters were insufflated.  The Veress needle was then removed.  A 5 mm trocar was inserted using the Optiview port under direct visualization with the laparoscope into the peritoneal cavity without difficulty.  The trocar was removed and the scope was reinserted, confirming proper port placement and no trauma to the underlying viscera.  Similarly a 5 mm incision was made in the right lower quadrant, lateral to the inferior epigastric vessels and a 5 mm trocar was placed through the incision into the peritoneal cavity, again without difficulty and no trauma to the underlying viscera.  The trocar was removed, leaving the port in place.  Using a blunt probe, the above-noted findings were ascertained.  A 15 mm left lower quadrant incision and 10 mm port was likewise placed.  Starting with the left fallopian tube, a Thunderbeat was used to seal and divide the mesosalpinx starting at the fimbriated end and going along taking care not to compromise the ovarian blood supply.  The tube was then amputated near the cornu.  The left tube was removed  via the ipsilateral port.  The right infundibulopelvic ligament was then sealed and divided after confirming the ureter was well out of harm's way.  The utero-ovarian ligament was also sealed and divided as was the right fallopian tube thereby resecting the right tube and ovary. This was likewise removed via the left lower quadrant port site using an endocatch bag with removal of the port to facilitate this.  Examination of both operative sites where the structures were resected confirmed hemostasis that was excellent  The left lower quadrant fascial puncture was closed with an O-Vicryl using a Elie-Dominguez.  The right lower quadrant port was then removed.  Hemostasis was confirmed at both port sites.  The pneumoperitoneum was released and the umbilical port was removed under direct visualization with the laparoscope as well.     All incisions were closed using 4-0 Vicryl subcuticular sutures.  Then 0.5% Marcaine was infiltrated in all incision sites for postop analgesia.  Steri-Strips and Band-Aids were placed. The Hulka tenaculum was removed.     Attention was then made to the hysteroscopic portion of the procedure.  A single-arm speculum was placed in the vagina to visualize the cervix, which was then grasped at 12 o'clock with a single-tooth tenaculum for downward traction of the uterus. An endocervical curettage was performed, and specimen sent to pathology. Using Hegar dilators, the cervix was gently dilated to a #6 Hegar dilator without difficulty. The Myosure operative hysteroscope was inserted into the endometrial cavity without difficulty under direct visualization.  Normal saline was used under continuous flow as a uterine distention medium. Fluid management and volumes were monitored with the Fluent fluid management system. The above-noted findings were ascertained. Using the Myosure intrauterine morcellator, the above noted polyp was resected without difficulty. The saline was evacuated from the  uterus and the scope was removed. A sharp curettage was performed throughout all quadrants, and specimen was sent to pathology. The tenaculum was removed and the cervix and uterus were hemostatic at the end.       The Pappas catheter were removed.  The cervix was hemostatic at the end.       The patient tolerated the procedure well.  Sponge, instrument and needle counts were correct x2.  The patient was taken to the recovery room and extubated in stable condition.           Jeffrey Bansal MD

## 2021-08-03 NOTE — ANESTHESIA CARE TRANSFER NOTE
Patient: Charity Duarte    Procedure(s):  Laparoscopic bilateral salpingectomy, right salpingo oophorectomy  operative hysteroscopy with intrauterine morcellator, dilation and curettage    Diagnosis: Request for sterilization [Z30.2]  Chronic pelvic pain in female [R10.2, G89.29]  Ovarian cyst [N83.209]  Thickened endometrium [R93.89]  Diagnosis Additional Information: No value filed.    Anesthesia Type:   General     Note:    Oropharynx: oropharynx clear of all foreign objects and spontaneously breathing  Level of Consciousness: awake  Oxygen Supplementation: face mask  Level of Supplemental Oxygen (L/min / FiO2): 8  Independent Airway: airway patency satisfactory and stable  Dentition: dentition unchanged  Vital Signs Stable: post-procedure vital signs reviewed and stable  Report to RN Given: handoff report given  Patient transferred to: PACU  Comments: Pt to recovery.  Spontaneous respirations and exchanging well.  Pt making needs known.  Report given to RN.    Handoff Report: Identifed the Patient, Identified the Reponsible Provider, Reviewed the pertinent medical history, Discussed the surgical course, Reviewed Intra-OP anesthesia mangement and issues during anesthesia, Set expectations for post-procedure period and Allowed opportunity for questions and acknowledgement of understanding      Vitals:  Vitals Value Taken Time   /72 08/03/21 0925   Temp 97.1  F (36.2  C) 08/03/21 0911   Pulse 72 08/03/21 0931   Resp 13 08/03/21 0931   SpO2 100 % 08/03/21 0931   Vitals shown include unvalidated device data.    Electronically Signed By: JOHN Arzola CRNA  August 3, 2021  9:32 AM

## 2021-08-03 NOTE — ANESTHESIA POSTPROCEDURE EVALUATION
Patient: Charity Duarte    Procedure(s):  Laparoscopic bilateral salpingectomy, right salpingo oophorectomy  operative hysteroscopy with intrauterine morcellator, dilation and curettage    Diagnosis:Request for sterilization [Z30.2]  Chronic pelvic pain in female [R10.2, G89.29]  Ovarian cyst [N83.209]  Thickened endometrium [R93.89]  Diagnosis Additional Information: No value filed.    Anesthesia Type:  General    Note:     Postop Pain Control: Uneventful            Sign Out: Well controlled pain   PONV: No   Neuro/Psych: Uneventful            Sign Out: Acceptable/Baseline neuro status   Airway/Respiratory: Uneventful            Sign Out: Acceptable/Baseline resp. status   CV/Hemodynamics: Uneventful            Sign Out: Acceptable CV status   Other NRE: NONE   DID A NON-ROUTINE EVENT OCCUR? No           Last vitals:  Vitals Value Taken Time   /68 08/03/21 0940   Temp 97.1  F (36.2  C) 08/03/21 0911   Pulse 58 08/03/21 0943   Resp 14 08/03/21 0943   SpO2 99 % 08/03/21 0943   Vitals shown include unvalidated device data.    Electronically Signed By: Osmin Law MD  August 3, 2021  9:44 AM

## 2021-08-04 ENCOUNTER — TELEPHONE (OUTPATIENT)
Dept: OBGYN | Facility: CLINIC | Age: 41
End: 2021-08-04

## 2021-08-04 PROBLEM — Z30.2 REQUEST FOR STERILIZATION: Status: RESOLVED | Noted: 2021-06-18 | Resolved: 2021-08-04

## 2021-08-04 PROBLEM — N83.209 OVARIAN CYST: Status: RESOLVED | Noted: 2021-06-18 | Resolved: 2021-08-04

## 2021-08-04 PROBLEM — R93.89 THICKENED ENDOMETRIUM: Status: RESOLVED | Noted: 2021-07-07 | Resolved: 2021-08-04

## 2021-08-04 PROBLEM — N83.292 COMPLEX CYST OF LEFT OVARY: Status: RESOLVED | Noted: 2021-07-07 | Resolved: 2021-08-04

## 2021-08-04 LAB
PATH REPORT.COMMENTS IMP SPEC: NORMAL
PATH REPORT.COMMENTS IMP SPEC: NORMAL
PATH REPORT.FINAL DX SPEC: NORMAL
PATH REPORT.GROSS SPEC: NORMAL
PATH REPORT.MICROSCOPIC SPEC OTHER STN: NORMAL
PATH REPORT.RELEVANT HX SPEC: NORMAL
PHOTO IMAGE: NORMAL

## 2021-08-04 PROCEDURE — 88305 TISSUE EXAM BY PATHOLOGIST: CPT | Mod: 26 | Performed by: PATHOLOGY

## 2021-08-04 NOTE — TELEPHONE ENCOUNTER
I spoke w/Pt at length: Reviewed her path report at length as well as d/w her postop recovery questions. She does not need to come back to see me if doing well, but if she has any questions, she should let me know or make appt.

## 2021-08-04 NOTE — TELEPHONE ENCOUNTER
----- Message from Marilyn Chi RN sent at 8/4/2021 10:49 AM CDT -----  Pt advised.  She is wanting more info regarding the right ovarian endometrioma.  Has questions about this.  Does she need a follow-up from surgery with you?    Marilyn Chi RN

## 2021-11-03 ENCOUNTER — TRANSFERRED RECORDS (OUTPATIENT)
Dept: HEALTH INFORMATION MANAGEMENT | Facility: CLINIC | Age: 41
End: 2021-11-03
Payer: COMMERCIAL

## 2022-01-21 LAB — PHQ9 SCORE: 21

## 2022-03-09 ENCOUNTER — TRANSFERRED RECORDS (OUTPATIENT)
Dept: HEALTH INFORMATION MANAGEMENT | Facility: CLINIC | Age: 42
End: 2022-03-09
Payer: COMMERCIAL

## 2022-04-22 ENCOUNTER — TRANSFERRED RECORDS (OUTPATIENT)
Dept: HEALTH INFORMATION MANAGEMENT | Facility: CLINIC | Age: 42
End: 2022-04-22
Payer: COMMERCIAL

## 2022-06-22 ENCOUNTER — TRANSFERRED RECORDS (OUTPATIENT)
Dept: HEALTH INFORMATION MANAGEMENT | Facility: CLINIC | Age: 42
End: 2022-06-22

## 2023-05-19 ENCOUNTER — ANCILLARY PROCEDURE (OUTPATIENT)
Dept: ULTRASOUND IMAGING | Facility: CLINIC | Age: 43
End: 2023-05-19
Attending: OBSTETRICS & GYNECOLOGY
Payer: COMMERCIAL

## 2023-05-19 ENCOUNTER — OFFICE VISIT (OUTPATIENT)
Dept: OBGYN | Facility: CLINIC | Age: 43
End: 2023-05-19
Payer: COMMERCIAL

## 2023-05-19 VITALS — WEIGHT: 197 LBS | SYSTOLIC BLOOD PRESSURE: 126 MMHG | BODY MASS INDEX: 32.78 KG/M2 | DIASTOLIC BLOOD PRESSURE: 82 MMHG

## 2023-05-19 DIAGNOSIS — R14.0 ABDOMINAL DISTENSION: ICD-10-CM

## 2023-05-19 DIAGNOSIS — N91.1 SECONDARY AMENORRHEA: Primary | ICD-10-CM

## 2023-05-19 DIAGNOSIS — R14.0 ABDOMINAL DISTENSION: Primary | ICD-10-CM

## 2023-05-19 DIAGNOSIS — N91.1 SECONDARY AMENORRHEA: ICD-10-CM

## 2023-05-19 LAB — HCG SERPL QL: NEGATIVE

## 2023-05-19 PROCEDURE — 36415 COLL VENOUS BLD VENIPUNCTURE: CPT | Performed by: OBSTETRICS & GYNECOLOGY

## 2023-05-19 PROCEDURE — 76830 TRANSVAGINAL US NON-OB: CPT | Performed by: OBSTETRICS & GYNECOLOGY

## 2023-05-19 PROCEDURE — 99214 OFFICE O/P EST MOD 30 MIN: CPT | Performed by: OBSTETRICS & GYNECOLOGY

## 2023-05-19 PROCEDURE — 84703 CHORIONIC GONADOTROPIN ASSAY: CPT | Performed by: OBSTETRICS & GYNECOLOGY

## 2023-05-19 PROCEDURE — 76856 US EXAM PELVIC COMPLETE: CPT | Performed by: OBSTETRICS & GYNECOLOGY

## 2023-05-19 RX ORDER — MEDROXYPROGESTERONE ACETATE 10 MG
10 TABLET ORAL DAILY
Qty: 10 TABLET | Refills: 0 | Status: SHIPPED | OUTPATIENT
Start: 2023-05-19 | End: 2023-05-29

## 2023-05-19 ASSESSMENT — ENCOUNTER SYMPTOMS
CONSTIPATION: 0
UNEXPECTED WEIGHT CHANGE: 1
NAUSEA: 0
DIARRHEA: 0
VOMITING: 0
ABDOMINAL DISTENTION: 1

## 2023-05-19 NOTE — PROGRESS NOTES
Assessment & Plan     Abdominal distension  - Need to confirm she is not pregnant despite prior Lap Bilat Salp (along w/ RSO), and also evaluate for pelvic mass  - US Pelvic Complete with Transvaginal; Future  - HCG qualitative, Blood ([id number]); Future  - If pelvic mass, refer to Gyn Onc  - If uterus and left ovary are normal, then will get CT Abd/Pelvis and refer to PCP to evaluate for non-Gyn causes of abd distention and weight gain    Secondary amenorrhea  - Need to confirm she is not pregnant despite prior Lap Bilat Salp.  - US Pelvic Complete with Transvaginal; Future  - HCG qualitative, Blood ([id number]); Future  - If HCG Neg and U/S WNL, will Rx Provera 10 mg x 10 days to induce w/d bleed.    Review of the result(s) of each unique test - Path report (8/3/2021) - confirmed normal tubes bilaterally adn right ovary with endometrioma         Nicotine/Tobacco Cessation:  She reports that she has been smoking cigarettes. She has been smoking an average of .5 packs per day. She has never used smokeless tobacco.            No follow-ups on file.    Jeffrey Bansal MD  [facility] is a 42 year old, presenting for the following health issues:  Nausea (Every morning )         View : No data to display.              Pt w/ increasing abd distention and weight gain of 15 lbs in last couple months. She feels like she is pregnant despite having undergone a Lap RSO and Left salpingectomy for sterilization and right endometrioma.  Path report confirms both tubes were normal and right ovarian endometrioma.  She had LMP 4/6/2023 which was on time and her usual heavy flow, but no menses since.  She feels her abn is markedly distended.  Her last Pap-WNL, HPV Neg 6/16/2021.  She had a UPT Neg 4/16/2023 at [facility] when Rx'd for Pyelo.               Review of Systems   Constitutional: Positive for unexpected weight change.   Gastrointestinal: Positive for abdominal distention.  Negative for constipation, diarrhea, nausea and vomiting.   Genitourinary: Positive for menstrual problem. Negative for vaginal bleeding and vaginal discharge.            Objective    /82 (BP Location: Right arm, Patient Position: Sitting, Cuff Size: Adult Regular)   Wt 89.4 kg (197 lb)   LMP 04/06/2023 (Within Weeks)   BMI 32.78 kg/m    Body mass index is 32.78 kg/m .  Physical Exam  Constitutional:       General: She is not in acute distress.     Appearance: Normal appearance. She is obese. She is not ill-appearing.   Neurological:      Mental Status: She is alert.     Abdomen- Abdomen soft, non-tender. BS normal. No masses, organomegaly, positive findings: protuberant vs distended  Pelvic- Exam chaperoned by nurse, External genitalia normal, Bartholin's glands normal, Windmill's glands normal, Urethral meatus normal, Urethra normal, Bladder normal, Vagina with normal rugae, no abnormal lesions, no abnormal discharge, Normal cervix without lesions or mucopus, no cervical motion tenderness, Uterus normal size, shape, and contour, no masses, non-tender, Adnexa normal size without masses or tenderness bilaterally, Anus normal, Pelvic exam limited by abdominal distention vs protuberance      Path report 8/3/2021:  Surgical Pathology Report                         Case: [id number]                                   Authorizing Provider:  Jeffrey Bansal MD        Collected:           08/03/2021 08:29 AM           Ordering Location:     [facility]   Received:            08/03/2021 09:26 AM                                  Main OR                                                                       Pathologist:           Gisella Calle                                                                Specimens:   A) - Ovary and Fallopian Tube, Bilateral, bilateral fallopian tubes, right ovary                     B) - Endocervical/cervical, endocervical curettings                                                   C) - Endometrium, Endometrial curettings                                                             D) - Endometrium, Endometrial polyp                                                        Final Diagnosis   A.  Fallopian tubes and right ovary, bilateral laparoscopic salpingectomy and right oophorectomy:  -Benign ovarian cyst with features suggestive of endometriotic cyst.  -Corpus luteum cyst.  -Cystic follicles.  -Benign fallopian tubes with paratubal cysts.     B.  Endocervix, curettage:  -Benign squamous epithelium and polypoid fragments of endocervical mucosa.     C. Endometrium, curettage:  -Proliferative pattern endometrium.  -Scant benign fragments of superficial squamous and endocervical epithelium.     D. Endometrium, polyp, polypectomy:  -Fragments of endometrial polyp.  -Proliferative pattern endometrium.

## 2023-05-19 NOTE — NURSING NOTE
"Chief Complaint   Patient presents with     Nausea     Every morning        Initial /82 (BP Location: Right arm, Patient Position: Sitting, Cuff Size: Adult Regular)   Wt 89.4 kg (197 lb)   LMP 2023 (Within Weeks)   BMI 32.78 kg/m   Estimated body mass index is 32.78 kg/m  as calculated from the following:    Height as of 8/3/21: 1.651 m (5' 5\").    Weight as of this encounter: 89.4 kg (197 lb).  BP completed using cuff size: large    Questioned patient about current smoking habits.  Pt. has never smoked.            Chavez Shabazz CMA            "

## 2023-05-24 ENCOUNTER — TELEPHONE (OUTPATIENT)
Dept: OBGYN | Facility: CLINIC | Age: 43
End: 2023-05-24
Payer: COMMERCIAL

## 2023-05-24 NOTE — TELEPHONE ENCOUNTER
Call from pt inquiring if she needs another appt after her US on 6/7. Also, she has many questions for you.     Last OV: 5/19    ADAM Dumont

## 2023-05-24 NOTE — TELEPHONE ENCOUNTER
Advise Pt that now that we have definitely confirmed she is not pregnant, and the small ovarian cysts are too small to cause her to be bloated, I would recommend she sees a PCP to evaluate her for non-Gyn causes for her abdominal bloating.  She may require a CT scan, but since the U/S clearly shows this is not ovarian or uterine in origin, (and she has a follow-up U/S 6/7 to recheck the ovarian cyst), this is not likely to be a problem that I can fix.  PCP can evaluate her for GI or other possible causes.

## 2023-05-24 NOTE — TELEPHONE ENCOUNTER
Call to pt to let her know that a f/up appt is not needed after US and as far as the abd distension goes, she should see a PCP to evaluate. Pt states that every time she gets her period, it's very heavy and painful, not wanting to take OCP since it changes her mood. Advised pt to make an appt to discuss options regarding period flow and pain. Pt transferred to .  AADM Dumont

## 2023-06-07 ENCOUNTER — TELEPHONE (OUTPATIENT)
Dept: OBGYN | Facility: CLINIC | Age: 43
End: 2023-06-07

## 2023-06-07 ENCOUNTER — ANCILLARY PROCEDURE (OUTPATIENT)
Dept: ULTRASOUND IMAGING | Facility: CLINIC | Age: 43
End: 2023-06-07
Payer: COMMERCIAL

## 2023-06-07 DIAGNOSIS — N91.1 SECONDARY AMENORRHEA: ICD-10-CM

## 2023-06-07 DIAGNOSIS — N94.6 DYSMENORRHEA: ICD-10-CM

## 2023-06-07 DIAGNOSIS — N80.9 ENDOMETRIOSIS: ICD-10-CM

## 2023-06-07 DIAGNOSIS — R10.2 CHRONIC PELVIC PAIN IN FEMALE: Primary | ICD-10-CM

## 2023-06-07 DIAGNOSIS — G89.29 CHRONIC PELVIC PAIN IN FEMALE: Primary | ICD-10-CM

## 2023-06-07 DIAGNOSIS — R93.89 THICKENED ENDOMETRIUM: Primary | ICD-10-CM

## 2023-06-07 PROCEDURE — 76830 TRANSVAGINAL US NON-OB: CPT | Performed by: OBSTETRICS & GYNECOLOGY

## 2023-06-07 PROCEDURE — 76856 US EXAM PELVIC COMPLETE: CPT | Performed by: OBSTETRICS & GYNECOLOGY

## 2023-06-07 NOTE — TELEPHONE ENCOUNTER
I spoke w/Pt:  Given her endometrium was evaluated just 2 years ago (8/3/2021) and was benign, I do think we can forgo the hysteroscopy w/ D&C if Pt wants to go forward with definitive Rx for primarily chronic pelvic pain and dysmenorrhea Pt suffers from presumptively from endometriosis which had been confirmed when her Lap RSO was done 8/3/2021 on path report showing a right endometrioma.  She understands if she undergoes a hysterectomy w/ Left oophorectomy, she will be surgically menopausal prematurely, which would place her at increased risk of osteoporosis, CV Dz, CVA risk, vaginal dryness, mood changes, weight gain, etc.  Pt accepts all these risks and wants to schedule it. Will cancel prior request for Op Hyst w/ [facility], and instead place request for Robotic TLH, Left oophorectomy (already has had RSO and left tube removed).

## 2023-06-07 NOTE — TELEPHONE ENCOUNTER
I spoke with the pt re: pelvic US results.     She is wanting to discuss having a complete hysterectomy vs a operative hysteroscopy.    Can you please call her to discuss this.   She is done with having her periods and the pain that she experiences with them.    Thanks.    Koki PATEL RN

## 2023-06-13 ENCOUNTER — TELEPHONE (OUTPATIENT)
Dept: OBGYN | Facility: CLINIC | Age: 43
End: 2023-06-13
Payer: COMMERCIAL

## 2023-06-13 NOTE — TELEPHONE ENCOUNTER
Patient Name: Charity Duarte   MRN: [id number]   Case#: [id number]   Surgeon(s) and Role:      * Jeffrey Bansal MD - Primary   Date requested: * No dates entered *   Location: [facility] OR   Procedure(s):   HYSTERECTOMY, ROBOT-ASSISTED, USING DA MELCHOR XI, WITH LEFT OOPHORECTOMY

## 2023-06-15 NOTE — TELEPHONE ENCOUNTER
Type of surgery: robotic assisted total laparoscopic hysterectomy, left oophorectomy  Location of surgery: [facility]  Date and time of surgery: 8/2/23 @ 7:50 am  Surgeon: Dr. Bansal  Pre-Op Appt Date: Patient advised to schedule.  Post-Op Appt Date: 9/15/23   Packet sent out: Yes  Pre-cert/Authorization completed:  No  Date: 6/15/23

## 2023-06-22 ENCOUNTER — TELEPHONE (OUTPATIENT)
Dept: FAMILY MEDICINE | Facility: CLINIC | Age: 43
End: 2023-06-22
Payer: COMMERCIAL

## 2023-06-22 ENCOUNTER — TELEPHONE (OUTPATIENT)
Dept: FAMILY MEDICINE | Facility: CLINIC | Age: 43
End: 2023-06-22

## 2023-06-22 NOTE — TELEPHONE ENCOUNTER
Reason for Call:  Other appointment    Detailed comments: patient called and needs a pre op physical; 7-10 days prior to procedure on August 2.    Any provider at [facility].    Please contact patient.  Thank you.    Phone Number Patient can be reached at: Cell number on file:    Telephone Information:   Mobile [phone]       Best Time: any    Can we leave a detailed message on this number? YES    Call taken on 6/22/2023 at 10:58 AM by Susan De La Fuente

## 2023-07-21 ENCOUNTER — OFFICE VISIT (OUTPATIENT)
Dept: FAMILY MEDICINE | Facility: CLINIC | Age: 43
End: 2023-07-21
Payer: COMMERCIAL

## 2023-07-21 VITALS
BODY MASS INDEX: 31.47 KG/M2 | HEART RATE: 70 BPM | DIASTOLIC BLOOD PRESSURE: 74 MMHG | OXYGEN SATURATION: 96 % | RESPIRATION RATE: 23 BRPM | SYSTOLIC BLOOD PRESSURE: 113 MMHG | HEIGHT: 65 IN | WEIGHT: 188.9 LBS | TEMPERATURE: 97.6 F

## 2023-07-21 DIAGNOSIS — R10.2 CHRONIC PELVIC PAIN IN FEMALE: ICD-10-CM

## 2023-07-21 DIAGNOSIS — G89.29 CHRONIC PELVIC PAIN IN FEMALE: ICD-10-CM

## 2023-07-21 DIAGNOSIS — Z01.818 PREOP GENERAL PHYSICAL EXAM: Primary | ICD-10-CM

## 2023-07-21 DIAGNOSIS — F41.9 ANXIETY: ICD-10-CM

## 2023-07-21 DIAGNOSIS — N80.9 ENDOMETRIOSIS: ICD-10-CM

## 2023-07-21 DIAGNOSIS — N94.6 DYSMENORRHEA: ICD-10-CM

## 2023-07-21 LAB
ANION GAP SERPL CALCULATED.3IONS-SCNC: 11 MMOL/L (ref 7–15)
BUN SERPL-MCNC: 14.7 MG/DL (ref 6–20)
CALCIUM SERPL-MCNC: 9.7 MG/DL (ref 8.6–10)
CHLORIDE SERPL-SCNC: 105 MMOL/L (ref 98–107)
CREAT SERPL-MCNC: 0.69 MG/DL (ref 0.51–0.95)
DEPRECATED HCO3 PLAS-SCNC: 22 MMOL/L (ref 22–29)
GFR SERPL CREATININE-BSD FRML MDRD: >90 ML/MIN/1.73M2
GLUCOSE SERPL-MCNC: 96 MG/DL (ref 70–99)
HGB BLD-MCNC: 14.9 G/DL (ref 11.7–15.7)
POTASSIUM SERPL-SCNC: 4.4 MMOL/L (ref 3.4–5.3)
SODIUM SERPL-SCNC: 138 MMOL/L (ref 136–145)

## 2023-07-21 PROCEDURE — 99214 OFFICE O/P EST MOD 30 MIN: CPT | Performed by: PHYSICIAN ASSISTANT

## 2023-07-21 PROCEDURE — 80048 BASIC METABOLIC PNL TOTAL CA: CPT | Performed by: PHYSICIAN ASSISTANT

## 2023-07-21 PROCEDURE — 36415 COLL VENOUS BLD VENIPUNCTURE: CPT | Performed by: PHYSICIAN ASSISTANT

## 2023-07-21 PROCEDURE — 85018 HEMOGLOBIN: CPT | Performed by: PHYSICIAN ASSISTANT

## 2023-07-21 RX ORDER — ESCITALOPRAM OXALATE 20 MG/1
20 TABLET ORAL DAILY
Qty: 135 TABLET | Refills: 0 | Status: SHIPPED | OUTPATIENT
Start: 2023-07-21 | End: 2023-11-13

## 2023-07-21 RX ORDER — BUSPIRONE HYDROCHLORIDE 15 MG/1
15 TABLET ORAL 2 TIMES DAILY
Qty: 180 TABLET | Refills: 0 | Status: SHIPPED | OUTPATIENT
Start: 2023-07-21

## 2023-07-21 RX ORDER — ALBUTEROL SULFATE 90 UG/1
2 AEROSOL, METERED RESPIRATORY (INHALATION) EVERY 6 HOURS PRN
Qty: 18 G | Refills: 0 | Status: SHIPPED | OUTPATIENT
Start: 2023-07-21

## 2023-07-21 ASSESSMENT — PATIENT HEALTH QUESTIONNAIRE - PHQ9
SUM OF ALL RESPONSES TO PHQ QUESTIONS 1-9: 20
10. IF YOU CHECKED OFF ANY PROBLEMS, HOW DIFFICULT HAVE THESE PROBLEMS MADE IT FOR YOU TO DO YOUR WORK, TAKE CARE OF THINGS AT HOME, OR GET ALONG WITH OTHER PEOPLE: VERY DIFFICULT
SUM OF ALL RESPONSES TO PHQ QUESTIONS 1-9: 20

## 2023-07-21 ASSESSMENT — PAIN SCALES - GENERAL: PAINLEVEL: SEVERE PAIN (7)

## 2023-07-21 NOTE — LETTER
July 31, 2023      Charity Duarte  [address] [place] [id number]          Charity,   Your labs are satisfactory for surgery. Shon Sarkar PA-C     Resulted Orders   Hemoglobin   Result Value Ref Range    Hemoglobin 14.9 11.7 - 15.7 g/dL   Basic metabolic panel  (Ca, Cl, CO2, Creat, Gluc, K, Na, BUN)   Result Value Ref Range    Sodium 138 136 - 145 mmol/L    Potassium 4.4 3.4 - 5.3 mmol/L    Chloride 105 98 - 107 mmol/L    Carbon Dioxide (CO2) 22 22 - 29 mmol/L    Anion Gap 11 7 - 15 mmol/L    Urea Nitrogen 14.7 6.0 - 20.0 mg/dL    Creatinine 0.69 0.51 - 0.95 mg/dL    Calcium 9.7 8.6 - 10.0 mg/dL    Glucose 96 70 - 99 mg/dL    GFR Estimate >90 >60 mL/min/1.73m2       If you have any questions or concerns, please call the clinic at the number listed above.

## 2023-07-21 NOTE — PATIENT INSTRUCTIONS
For informational purposes only. Not to replace the advice of your health care provider. Copyright   2003,  [employer] [facility]. All rights reserved. Clinically reviewed by Pooja Winter MD. [employer] [id number] - REV .  Preparing for Your Surgery  Getting started  A nurse will call you to review your health history and instructions. They will give you an arrival time based on your scheduled surgery time. Please be ready to share:  Your doctor's clinic name and phone number  Your medical, surgical, and anesthesia history  A list of allergies and sensitivities  A list of medicines, including herbal treatments and over-the-counter drugs  Whether the patient has a legal guardian (ask how to send us the papers in advance)  Please tell us if you're pregnant--or if there's any chance you might be pregnant. Some surgeries may injure a fetus (unborn baby), so they require a pregnancy test. Surgeries that are safe for a fetus don't always need a test, and you can choose whether to have one.   If you have a child who's having surgery, please ask for a copy of Preparing for Your Child's Surgery.    Preparing for surgery  Within 10 to 30 days of surgery: Have a pre-op exam (sometimes called an H&P, or History and Physical). This can be done at a clinic or pre-operative center.  If you're having a , you may not need this exam. Talk to your care team.  At your pre-op exam, talk to your care team about all medicines you take. If you need to stop any medicines before surgery, ask when to start taking them again.  We do this for your safety. Many medicines can make you bleed too much during surgery. Some change how well surgery (anesthesia) drugs work.  Call your insurance company to let them know you're having surgery. (If you don't have insurance, call [phone].)  Call your clinic if there's any change in your health. This includes signs of a cold or flu (sore throat, runny nose, cough, rash, fever). It  also includes a scrape or scratch near the surgery site.  If you have questions on the day of surgery, call your hospital or surgery center.  Eating and drinking guidelines  For your safety: Unless your surgeon tells you otherwise, follow the guidelines below.  Eat and drink as usual until 8 hours before you arrive for surgery. After that, no food or milk.  Drink clear liquids until 2 hours before you arrive. These are liquids you can see through, like water, Gatorade, and Propel Water. They also include plain black coffee and tea (no cream or milk), candy, and breath mints. You can spit out gum when you arrive.  If you drink alcohol: Stop drinking it the night before surgery.  If your care team tells you to take medicine on the morning of surgery, it's okay to take it with a sip of water.  Preventing infection  Shower or bathe the night before and morning of your surgery. Follow the instructions your clinic gave you. (If no instructions, use regular soap.)  Don't shave or clip hair near your surgery site. We'll remove the hair if needed.  Don't smoke or vape the morning of surgery. You may chew nicotine gum up to 2 hours before surgery. A nicotine patch is okay.  Note: Some surgeries require you to completely quit smoking and nicotine. Check with your surgeon.  Your care team will make every effort to keep you safe from infection. We will:  Clean our hands often with soap and water (or an alcohol-based hand rub).  Clean the skin at your surgery site with a special soap that kills germs.  Give you a special gown to keep you warm. (Cold raises the risk of infection.)  Wear special hair covers, masks, gowns and gloves during surgery.  Give antibiotic medicine, if prescribed. Not all surgeries need antibiotics.  What to bring on the day of surgery  Photo ID and insurance card  Copy of your health care directive, if you have one  Glasses and hearing aids (bring cases)  You can't wear contacts during surgery  Inhaler and  eye drops, if you use them (tell us about these when you arrive)  CPAP machine or breathing device, if you use them  A few personal items, if spending the night  If you have . . .  A pacemaker, ICD (cardiac defibrillator) or other implant: Bring the ID card.  An implanted stimulator: Bring the remote control.  A legal guardian: Bring a copy of the certified (court-stamped) guardianship papers.  Please remove any jewelry, including body piercings. Leave jewelry and other valuables at home.  If you're going home the day of surgery  You must have a responsible adult drive you home. They should stay with you overnight as well.  If you don't have someone to stay with you, and you aren't safe to go home alone, we may keep you overnight. Insurance often won't pay for this.  After surgery  If it's hard to control your pain or you need more pain medicine, please call your surgeon's office.  Questions?   If you have any questions for your care team, list them here: _________________________________________________________________________________________________________________________________________________________________________ ____________________________________ ____________________________________ ____________________________________    How to Take Your Medication Before Surgery  - Take all of your medications before surgery except as noted below  - You must STOP all ibuprofen, aspirin and aleve one week prior to surgery; Tylenol is ok at 1000mg three times daily or as needed

## 2023-07-21 NOTE — PROGRESS NOTES
[facility]  [address] [facility] [address]  Phone: [phone]  Primary Provider: Jaime Sanchez  Pre-op Performing Provider: JAIME SANCHEZ      PREOPERATIVE EVALUATION:  Today's date: 7/21/2023    Charity Duarte is a 42 year old female who presents for a preoperative evaluation.      7/21/2023     8:44 AM   Additional Questions   Roomed by Tania BUTT   Accompanied by Self         7/21/2023     8:44 AM   Patient Reported Additional Medications   Patient reports taking the following new medications None     Surgical Information:  Surgery/Procedure: Hysterectomy  Surgery Location: [facility]  Surgeon: Jeffrey Bansal MD  Surgery Date: 8/2/2023  Time of Surgery: 7:50 A.M  Where patient plans to recover: At home with family  Fax number for surgical facility: Note does not need to be faxed, will be available electronically in Epic.  Answers for HPI/ROS submitted by the patient on 7/21/2023  If you checked off any problems, how difficult have these problems made it for you to do your work, take care of things at home, or get along with other people?: Very difficult  PHQ9 TOTAL SCORE: 20      Assessment & Plan     The proposed surgical procedure is considered INTERMEDIATE risk.    Preop general physical exam  Chronic pelvic pain in female  Dysmenorrhea  Endometriosis  She is ok for planned procedure. She should limit/cease cigarettes as best she can prior to procedure and following. Knows to avoid nsaids  - albuterol (PROAIR HFA/PROVENTIL HFA/VENTOLIN HFA) 108 (90 Base) MCG/ACT inhaler; Inhale 2 puffs into the lungs every 6 hours as needed for shortness of breath or wheezing  - Hemoglobin; Future  - Basic metabolic panel  (Ca, Cl, CO2, Creat, Gluc, K, Na, BUN); Future  - Hemoglobin  - Basic metabolic panel  (Ca, Cl, CO2, Creat, Gluc, K, Na, BUN)        Anxiety  Refilling per patient request. She should follow up with psych to continue or would need a follow up visit to  discuss.   - busPIRone (BUSPAR) 15 MG tablet; Take 1 tablet (15 mg) by mouth 2 times daily  - escitalopram (LEXAPRO) 20 MG tablet; Take 1 tablet (20 mg) by mouth daily Takes 1 1/2 tablet = 30 mg                 RECOMMENDATION:  APPROVAL GIVEN to proceed with proposed procedure, without further diagnostic evaluation.      Subjective       HPI related to upcoming procedure: ROBOT-ASSISTED TOTAL LAPAROSCOPIC HYSTERECTOMY, WITH LEFT OOPHORECTOMY        7/21/2023     8:14 AM   Preop Questions   1. Have you ever had a heart attack or stroke? No   2. Have you ever had surgery on your heart or blood vessels, such as a stent placement, a coronary artery bypass, or surgery on an artery in your head, neck, heart, or legs? No   3. Do you have chest pain with activity? No   4. Do you have a history of  heart failure? No   5. Do you currently have a cold, bronchitis or symptoms of other infection? No   6. Do you have a cough, shortness of breath, or wheezing? No   7. Do you or anyone in your family have previous history of blood clots? No   8. Do you or does anyone in your family have a serious bleeding problem such as prolonged bleeding following surgeries or cuts? No   9. Have you ever had problems with anemia or been told to take iron pills? YES - not using since last pregnancy   10. Have you had any abnormal blood loss such as black, tarry or bloody stools, or abnormal vaginal bleeding? No   11. Have you ever had a blood transfusion? No   12. Are you willing to have a blood transfusion if it is medically needed before, during, or after your surgery? Yes   13. Have you or any of your relatives ever had problems with anesthesia? No   14. Do you have sleep apnea, excessive snoring or daytime drowsiness? No   15. Do you have any artifical heart valves or other implanted medical devices like a pacemaker, defibrillator, or continuous glucose monitor? No   16. Do you have artificial joints? No   17. Are you allergic to latex? YES:  will break out in rash   18. Is there any chance that you may be pregnant? No       Preoperative Review of :   reviewed - 1 Rx for norco 4/17/23        Review of Systems  Constitutional, neuro, ENT, endocrine, pulmonary, cardiac, gastrointestinal, genitourinary, musculoskeletal, integument and psychiatric systems are negative, except as otherwise noted.    Patient Active Problem List    Diagnosis Date Noted     Dysmenorrhea 06/07/2023     Priority: Medium     Endometriosis 06/07/2023     Priority: Medium     Thickened endometrium 07/07/2021     Priority: Medium     Chronic pelvic pain in female 06/25/2021     Priority: Medium     Complex cyst of both ovaries 06/25/2021     Priority: Medium     Class 1 obesity due to excess calories without serious comorbidity with body mass index (BMI) of 31.0 to 31.9 in adult 06/16/2021     Priority: Medium     Nipple discharge in female 03/19/2012     Priority: Medium     3/2012:  Right sided nipple discharge.  Labs normal.  Cytology shows some blood.  US/Mammo notes small cyst, otherwise normal.  Surgeon consult obtained. Surgery will recheck in 6 months with US and appt.  Think discharge likely physiologic but if persists, she will contact Dr. Cisneros and further eval done.        Tobacco abuse 03/05/2012     Priority: Medium     Dysplasia of cervix, high grade CIERA 2 03/14/2008     Priority: Medium     8/10/06 ASCUS pap  9/4/07 LSIL, suggestive of HSIL  9/28/07 [place] - Scant fragments of unremarkable cervical mucosa  3/14/08 LEEP Bx: CIERA 2, margins neg for dysplasia  2/2/09 NIL pap  7/8/10 NIL pap  [Above per Care Everywhere]   6/21/13 NIL pap, neg HR HPV  11/21/14 NIL pap, neg HR HPV (in Care Everywhere)   6/16/21 NIL pap, neg HR HPV. Plan 3 year cotest          Past Medical History:   Diagnosis Date     Anxiety state, unspecified      Arthritis      Miscarriage     multiple in past     Motion sickness      Past Surgical History:   Procedure Laterality Date     DILATION AND  CURETTAGE, OPERATIVE HYSTEROSCOPY WITH MORCELLATOR, COMBINED N/A 8/3/2021    Procedure: operative hysteroscopy with intrauterine morcellator, dilation and curettage;  Surgeon: Jeffrey Bansal MD;  Location:  OR     DILATION AND CURETTAGE, OPERATIVE HYSTEROSCOPY, COMBINED  08/03/2021    Laparoscopic right salpingo-oophorectomy, Left salpingectomy, Hysteroscopic endometrial polypectomy with intrauterine morcellator, Dilatation and curettage - Path right ovarian endometrioma, normal tubes, benign endometrial polyp, benign endometrium     ENT SURGERY  2009    wisdom tooth extraction     LAPAROSCOPIC SALPINGECTOMY Left 08/03/2021    Laparoscopic right salpingo-oophorectomy, Left salpingectomy, Hysteroscopic endometrial polypectomy with intrauterine morcellator, Dilatation and curettage - Path right ovarian endometrioma, normal tubes, benign endometrial polyp, benign endometrium     LAPAROSCOPIC SALPINGO-OOPHORECTOMY Right 08/03/2021    Laparoscopic right salpingo-oophorectomy, Left salpingectomy, Hysteroscopic endometrial polypectomy with intrauterine morcellator, Dilatation and curettage - Path right ovarian endometrioma, normal tubes, benign endometrial polyp, benign endometrium     LAPAROSCOPIC SALPINGO-OOPHORECTOMY Bilateral 8/3/2021    Procedure: Laparoscopic bilateral salpingectomy, right salpingo oophorectomy;  Surgeon: Jeffrey Bansal MD;  Location:  OR     [facility] STABISM SURG,PREV EYE SURG,NOT MUSC      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     Current Outpatient Medications   Medication Sig Dispense Refill     Acetaminophen (TYLENOL PO)        albuterol (PROAIR HFA/PROVENTIL HFA/VENTOLIN HFA) 108 (90 Base) MCG/ACT inhaler Inhale 2 puffs into the lungs every 6 hours as needed for shortness of breath / dyspnea or wheezing 1 Inhaler 0     busPIRone (BUSPAR) 15 MG tablet Take 7.5 mg by mouth 2 times daily        escitalopram (LEXAPRO) 20 MG tablet Take 20 mg by mouth daily Takes 1 1/2 tablet = 30 mg       ibuprofen (ADVIL/MOTRIN)  "600 MG tablet Take 1 tablet (600 mg) by mouth every 6 hours as needed for pain Take w/ food 30 tablet 0     loratadine (CLARITIN) 10 MG tablet Take 10 mg by mouth daily         Allergies   Allergen Reactions     Bees Anaphylaxis     Marijuana [Cannabis] Anaphylaxis     Mold Cough     Pollen Extract Cough     Seasonal Allergies      Latex Swelling and Rash        Social History     Tobacco Use     Smoking status: Every Day     Packs/day: 0.50     Types: Cigarettes     Passive exposure: Current     Smokeless tobacco: Never     Tobacco comments:     amount varies to stress   Substance Use Topics     Alcohol use: Not Currently     History   Drug Use No         Objective     /74 (BP Location: Right arm, Patient Position: Sitting, Cuff Size: Adult Large)   Pulse 70   Temp 97.6  F (36.4  C) (Oral)   Resp 23   Ht 1.651 m (5' 5\")   Wt 85.7 kg (188 lb 14.4 oz)   LMP 06/27/2023 (Exact Date)   SpO2 96%   BMI 31.43 kg/m      Physical Exam    GENERAL APPEARANCE: healthy, alert and no distress     EYES: EOMI, PERRL     HENT: ear canals and TM's normal and nose and mouth without ulcers or lesions     NECK: no adenopathy, no asymmetry, masses, or scars and thyroid normal to palpation     RESP: lungs clear to auscultation - no rales, rhonchi or wheezes     CV: regular rates and rhythm, normal S1 S2, no S3 or S4 and no murmur, click or rub     ABDOMEN:  soft, nontender, no HSM or masses and bowel sounds normal     MS: No peripheral edema      SKIN: no suspicious lesions or rashes     PSYCH: mentation appears normal. and affect normal/bright    No results for input(s): HGB, PLT, INR, NA, POTASSIUM, CR, A1C in the last 31609 hours.     Diagnostics:  Recent Results (from the past 240 hour(s))   Hemoglobin    Collection Time: 07/21/23  9:36 AM   Result Value Ref Range    Hemoglobin 14.9 11.7 - 15.7 g/dL   Basic metabolic panel  (Ca, Cl, CO2, Creat, Gluc, K, Na, BUN)    Collection Time: 07/21/23  9:36 AM   Result Value Ref " Range    Sodium 138 136 - 145 mmol/L    Potassium 4.4 3.4 - 5.3 mmol/L    Chloride 105 98 - 107 mmol/L    Carbon Dioxide (CO2) 22 22 - 29 mmol/L    Anion Gap 11 7 - 15 mmol/L    Urea Nitrogen 14.7 6.0 - 20.0 mg/dL    Creatinine 0.69 0.51 - 0.95 mg/dL    Calcium 9.7 8.6 - 10.0 mg/dL    Glucose 96 70 - 99 mg/dL    GFR Estimate >90 >60 mL/min/1.73m2      No EKG required, no history of coronary heart disease, significant arrhythmia, peripheral arterial disease or other structural heart disease.    Revised Cardiac Risk Index (RCRI):  The patient has the following serious cardiovascular risks for perioperative complications:   - No serious cardiac risks = 0 points     RCRI Interpretation: 0 points: Class I (very low risk - 0.4% complication rate)           Signed Electronically by: Jaime Sanchez PA-C  Copy of this evaluation report is provided to requesting physician.

## 2023-07-24 ENCOUNTER — OFFICE VISIT (OUTPATIENT)
Dept: OBGYN | Facility: CLINIC | Age: 43
End: 2023-07-24
Payer: COMMERCIAL

## 2023-07-24 VITALS
WEIGHT: 188 LBS | DIASTOLIC BLOOD PRESSURE: 76 MMHG | SYSTOLIC BLOOD PRESSURE: 110 MMHG | BODY MASS INDEX: 31.32 KG/M2 | HEIGHT: 65 IN

## 2023-07-24 DIAGNOSIS — N94.6 DYSMENORRHEA: ICD-10-CM

## 2023-07-24 DIAGNOSIS — G89.29 CHRONIC PELVIC PAIN IN FEMALE: Primary | ICD-10-CM

## 2023-07-24 DIAGNOSIS — Z79.2 PROPHYLACTIC ANTIBIOTIC: ICD-10-CM

## 2023-07-24 DIAGNOSIS — N83.202 LEFT OVARIAN CYST: ICD-10-CM

## 2023-07-24 DIAGNOSIS — R10.2 CHRONIC PELVIC PAIN IN FEMALE: Primary | ICD-10-CM

## 2023-07-24 PROCEDURE — 99214 OFFICE O/P EST MOD 30 MIN: CPT | Performed by: OBSTETRICS & GYNECOLOGY

## 2023-07-24 RX ORDER — CLINDAMYCIN PHOSPHATE 20 MG/G
1 CREAM VAGINAL AT BEDTIME
Qty: 40 G | Refills: 0 | Status: SHIPPED | OUTPATIENT
Start: 2023-07-24 | End: 2023-07-25

## 2023-07-24 NOTE — NURSING NOTE
"Chief Complaint   Patient presents with    Follow Up     Hysterectomy on 2023       Initial /76 (BP Location: Right arm, Patient Position: Sitting, Cuff Size: Adult Large)   Ht 1.651 m (5' 5\")   Wt 85.3 kg (188 lb)   LMP 2023 (Exact Date)   BMI 31.28 kg/m   Estimated body mass index is 31.28 kg/m  as calculated from the following:    Height as of this encounter: 1.651 m (5' 5\").    Weight as of this encounter: 85.3 kg (188 lb).  BP completed using cuff size: large    Questioned patient about current smoking habits.  Pt. has never smoked.          The following HM Due: NONE    Chavez Shabazz CMA                "

## 2023-07-24 NOTE — PROGRESS NOTES
"  Assessment & Plan     Chronic pelvic pain in female  - Unresponsive to medical management  - Scheduled for Robotic TLH, Left oophorectomy 8/2/2023  - She understands the indications/risks/benefits/alternatives of the proposed procedure and has given informed consent.  - Has bowel prep info.  - Of note, despite her U/S showing a thickened endometrium, she had endometrial sampling 8/3/2021 that was benign and thus repeat sampling was not felt needed.      Dysmenorrhea  - Unresponsive to medical management  - Scheduled for Robotic TLH, Left oophorectomy 8/2/2023  - She understands the indications/risks/benefits/alternatives of the proposed procedure and has given informed consent.    Left ovarian cyst  - Desires oophorectomy in case this is contributing to pain  - Understands she will be prematurely surgically menopausal, and understands all the long-term health effects of this.  - Soonest could start combined E-P HRT would be 3 months postop due to endometriosis.    Prophylactic antibiotic  - Rx sent to pharm clindamycin (CLEOCIN) 2 % vaginal cream; Place 1 applicator vaginally At Bedtime for 5 days Use for 5 nights prior to surgery date.    Review of the result(s) of each unique test - Pelvic U/S as noted below  32 minutes spent by me on the date of the encounter doing chart review, review of test results, patient visit, and documentation        BMI:   Estimated body mass index is 31.28 kg/m  as calculated from the following:    Height as of this encounter: 1.651 m (5' 5\").    Weight as of this encounter: 85.3 kg (188 lb).           No follow-ups on file.    Jeffrey Bansal MD  [facility] V[facility]CTOR Nash is a 42 year old, presenting for the following health issues:  Follow Up (Hysterectomy on 08/02/2023)      7/21/2023     8:44 AM   Additional Questions   Roomed by Tania BUTT   Accompanied by Self     Pt w/ chronic pelvic pain, Dysmenorrhea, and left ovarian cyst on recent U/S " "unresponsive to medical management.  She is scheduled for Robotic TLH, L Oophorectomy on 8/2/2023 (has had prior Lap Bilat Salp and right oophorectomy).  She is here to review her upcoming surgery.                 Review of Systems         Objective    /76 (BP Location: Right arm, Patient Position: Sitting, Cuff Size: Adult Large)   Ht 1.651 m (5' 5\")   Wt 85.3 kg (188 lb)   LMP 06/27/2023 (Exact Date)   BMI 31.28 kg/m    Body mass index is 31.28 kg/m .  Physical Exam  Constitutional:       General: She is not in acute distress.     Appearance: Normal appearance. She is normal weight. She is not ill-appearing.   Abdominal:      General: Abdomen is flat. There is no distension.      Palpations: Abdomen is soft. There is no mass.      Tenderness: There is no abdominal tenderness. There is no guarding or rebound.   Neurological:      Mental Status: She is alert.              Labs:  Office Visit on 07/21/2023   Component Date Value Ref Range Status    Hemoglobin 07/21/2023 14.9  11.7 - 15.7 g/dL Final    Sodium 07/21/2023 138  136 - 145 mmol/L Final    Potassium 07/21/2023 4.4  3.4 - 5.3 mmol/L Final    Chloride 07/21/2023 105  98 - 107 mmol/L Final    Carbon Dioxide (CO2) 07/21/2023 22  22 - 29 mmol/L Final    Anion Gap 07/21/2023 11  7 - 15 mmol/L Final    Urea Nitrogen 07/21/2023 14.7  6.0 - 20.0 mg/dL Final    Creatinine 07/21/2023 0.69  0.51 - 0.95 mg/dL Final    Calcium 07/21/2023 9.7  8.6 - 10.0 mg/dL Final    Glucose 07/21/2023 96  70 - 99 mg/dL Final    GFR Estimate 07/21/2023 >90  >60 mL/min/1.73m2 Final       Pelvic U/S 6/7/2023:  Results for orders placed or performed in visit on 06/07/23   US Pelvic Complete with Transvaginal    Narrative    [facility]     ULTRASOUND - PELVIC GYN- Transabdominal and Transvaginal     Referring MD: Jeffrey Bansal MD     ===================================     CLINICAL INFORMATION     Indications for ultrasound:   Follow-up " ovarian cysts     LMP: 2 Jun 2023    Hormones: none     Measurements:  Uterus:  8.66 x 6.74 x 4.72 cm     Position is retroverted.  Contour is smooth/regular.     Endo cav: 14.11 mm       Smooth/regular/wnl     Right ovary: surgically removed    Left ovary:   3.6 x 4.1 x 2.2  cm Simple cyst 1.7 x 1.7 x 2.0 cm     Cul de sac: no free fluid     ===================================  Impression:     Comparison study dated 5/19/2023.    Complete pelvic ultrasound using realtime   transabdominal and transvaginal scanning    Bladder appears normal     Normal uterus.  Uterus is retroverted.    Endometrium noted to be slightly prominent but less than last study.     Right ovary surgically absent.  Simple small left ovarian cyst, benign appearing and consistent with   dominant follicle. Prior 2 complex cysts are no longer seen.    No cul-de-sac fluid.    Consider endometrial sampling to rule out endometrial neoplasm,   hyperplasia, or polyp.      Jeffrey Bansal MD  Obstetrics & Gynecology  [facility]      Note: Federal law requires the release of results to patients even prior   to the ordering provider viewing the result. Your provider will notify   you, generally within the next business day, of any critical results. If   follow up is necessary, you will be notified at that time. Normal results,   and abnormal but non-urgent results, will generally be addressed within   2-3 business days.

## 2023-07-25 ENCOUNTER — TELEPHONE (OUTPATIENT)
Dept: OBGYN | Facility: CLINIC | Age: 43
End: 2023-07-25
Payer: COMMERCIAL

## 2023-07-25 DIAGNOSIS — Z79.2 PROPHYLACTIC ANTIBIOTIC: Primary | ICD-10-CM

## 2023-07-25 RX ORDER — METRONIDAZOLE 7.5 MG/G
1 GEL VAGINAL 2 TIMES DAILY
Qty: 70 G | Refills: 0 | Status: SHIPPED | OUTPATIENT
Start: 2023-07-25 | End: 2023-07-30

## 2023-07-25 NOTE — TELEPHONE ENCOUNTER
[facility] pharmacy notified us that pts insurance does not cover the Clindamycin 2% vag cream.    Please fax an alternative.     Koki PATEL RN

## 2023-07-25 NOTE — TELEPHONE ENCOUNTER
Called number listed for pt. No answer and unable to leave a message as the mailbox has not been set up.    Pt does not have my chart.    Koki PATEL RN

## 2023-07-25 NOTE — TELEPHONE ENCOUNTER
Advise Pt I sent Rx Metrogel vaginal, however with that she needs to use 1 applicator BID for the 5 days leading up to her surgery, instead of just at bedtime.

## 2023-07-26 RX ORDER — NAPROXEN SODIUM 220 MG
220 TABLET ORAL DAILY PRN
Status: ON HOLD | COMMUNITY
End: 2023-08-02

## 2023-08-02 ENCOUNTER — ANESTHESIA (OUTPATIENT)
Dept: SURGERY | Facility: CLINIC | Age: 43
End: 2023-08-02
Payer: COMMERCIAL

## 2023-08-02 ENCOUNTER — HOSPITAL ENCOUNTER (OUTPATIENT)
Facility: CLINIC | Age: 43
Discharge: HOME | End: 2023-08-02
Attending: OBSTETRICS & GYNECOLOGY | Admitting: OBSTETRICS & GYNECOLOGY
Payer: COMMERCIAL

## 2023-08-02 ENCOUNTER — ANESTHESIA EVENT (OUTPATIENT)
Dept: SURGERY | Facility: CLINIC | Age: 43
End: 2023-08-02
Payer: COMMERCIAL

## 2023-08-02 VITALS
SYSTOLIC BLOOD PRESSURE: 99 MMHG | TEMPERATURE: 97.3 F | HEART RATE: 67 BPM | WEIGHT: 184.9 LBS | RESPIRATION RATE: 14 BRPM | BODY MASS INDEX: 30.77 KG/M2 | DIASTOLIC BLOOD PRESSURE: 64 MMHG | OXYGEN SATURATION: 97 %

## 2023-08-02 DIAGNOSIS — R11.2 POSTOPERATIVE NAUSEA AND VOMITING: ICD-10-CM

## 2023-08-02 DIAGNOSIS — G89.29 CHRONIC PELVIC PAIN IN FEMALE: ICD-10-CM

## 2023-08-02 DIAGNOSIS — G89.18 POSTOPERATIVE PAIN: ICD-10-CM

## 2023-08-02 DIAGNOSIS — Z98.890 POSTOPERATIVE NAUSEA AND VOMITING: ICD-10-CM

## 2023-08-02 DIAGNOSIS — N94.6 DYSMENORRHEA: ICD-10-CM

## 2023-08-02 DIAGNOSIS — R10.2 CHRONIC PELVIC PAIN IN FEMALE: ICD-10-CM

## 2023-08-02 DIAGNOSIS — N80.9 ENDOMETRIOSIS: Primary | ICD-10-CM

## 2023-08-02 PROBLEM — N83.291 COMPLEX CYST OF BOTH OVARIES: Status: RESOLVED | Noted: 2021-06-25 | Resolved: 2023-08-02

## 2023-08-02 PROBLEM — N83.292 COMPLEX CYST OF BOTH OVARIES: Status: RESOLVED | Noted: 2021-06-25 | Resolved: 2023-08-02

## 2023-08-02 PROBLEM — R93.89 THICKENED ENDOMETRIUM: Status: RESOLVED | Noted: 2021-07-07 | Resolved: 2023-08-02

## 2023-08-02 LAB
CREAT SERPL-MCNC: 0.64 MG/DL (ref 0.51–0.95)
ERYTHROCYTE [DISTWIDTH] IN BLOOD BY AUTOMATED COUNT: 13.1 % (ref 10–15)
GFR SERPL CREATININE-BSD FRML MDRD: >90 ML/MIN/1.73M2
HCG UR QL: NEGATIVE
HCT VFR BLD AUTO: 39.7 % (ref 35–47)
HGB BLD-MCNC: 13.7 G/DL (ref 11.7–15.7)
HGB BLD-MCNC: 13.7 G/DL (ref 11.7–15.7)
MCH RBC QN AUTO: 31.3 PG (ref 26.5–33)
MCHC RBC AUTO-ENTMCNC: 35 G/DL (ref 31.5–36.5)
MCV RBC AUTO: 89 FL (ref 78–100)
PLATELET # BLD AUTO: 230 10E3/UL (ref 150–450)
RBC # BLD AUTO: 4.47 10E6/UL (ref 3.8–5.2)
WBC # BLD AUTO: 9 10E3/UL (ref 4–11)

## 2023-08-02 PROCEDURE — 250N000011 HC RX IP 250 OP 636: Performed by: OBSTETRICS & GYNECOLOGY

## 2023-08-02 PROCEDURE — 85018 HEMOGLOBIN: CPT | Performed by: OBSTETRICS & GYNECOLOGY

## 2023-08-02 PROCEDURE — 710N000012 HC RECOVERY PHASE 2, PER MINUTE: Performed by: OBSTETRICS & GYNECOLOGY

## 2023-08-02 PROCEDURE — 360N000080 HC SURGERY LEVEL 7, PER MIN: Performed by: OBSTETRICS & GYNECOLOGY

## 2023-08-02 PROCEDURE — 250N000011 HC RX IP 250 OP 636: Performed by: ANESTHESIOLOGY

## 2023-08-02 PROCEDURE — 81025 URINE PREGNANCY TEST: CPT | Performed by: OBSTETRICS & GYNECOLOGY

## 2023-08-02 PROCEDURE — 258N000003 HC RX IP 258 OP 636: Performed by: NURSE ANESTHETIST, CERTIFIED REGISTERED

## 2023-08-02 PROCEDURE — 999N000141 HC STATISTIC PRE-PROCEDURE NURSING ASSESSMENT: Performed by: OBSTETRICS & GYNECOLOGY

## 2023-08-02 PROCEDURE — 258N000001 HC RX 258: Performed by: OBSTETRICS & GYNECOLOGY

## 2023-08-02 PROCEDURE — 58571 TLH W/T/O 250 G OR LESS: CPT | Performed by: OBSTETRICS & GYNECOLOGY

## 2023-08-02 PROCEDURE — 250N000011 HC RX IP 250 OP 636: Mod: JZ | Performed by: NURSE ANESTHETIST, CERTIFIED REGISTERED

## 2023-08-02 PROCEDURE — 85027 COMPLETE CBC AUTOMATED: CPT | Performed by: OBSTETRICS & GYNECOLOGY

## 2023-08-02 PROCEDURE — 250N000009 HC RX 250: Performed by: NURSE ANESTHETIST, CERTIFIED REGISTERED

## 2023-08-02 PROCEDURE — 710N000009 HC RECOVERY PHASE 1, LEVEL 1, PER MIN: Performed by: OBSTETRICS & GYNECOLOGY

## 2023-08-02 PROCEDURE — 258N000003 HC RX IP 258 OP 636: Performed by: ANESTHESIOLOGY

## 2023-08-02 PROCEDURE — 370N000017 HC ANESTHESIA TECHNICAL FEE, PER MIN: Performed by: OBSTETRICS & GYNECOLOGY

## 2023-08-02 PROCEDURE — S2900 ROBOTIC SURGICAL SYSTEM: HCPCS | Performed by: OBSTETRICS & GYNECOLOGY

## 2023-08-02 PROCEDURE — 272N000001 HC OR GENERAL SUPPLY STERILE: Performed by: OBSTETRICS & GYNECOLOGY

## 2023-08-02 PROCEDURE — 250N000013 HC RX MED GY IP 250 OP 250 PS 637: Performed by: OBSTETRICS & GYNECOLOGY

## 2023-08-02 PROCEDURE — 82565 ASSAY OF CREATININE: CPT | Performed by: OBSTETRICS & GYNECOLOGY

## 2023-08-02 PROCEDURE — 250N000025 HC SEVOFLURANE, PER MIN: Performed by: OBSTETRICS & GYNECOLOGY

## 2023-08-02 PROCEDURE — 88307 TISSUE EXAM BY PATHOLOGIST: CPT | Mod: 26 | Performed by: PATHOLOGY

## 2023-08-02 PROCEDURE — 88307 TISSUE EXAM BY PATHOLOGIST: CPT | Mod: TC | Performed by: OBSTETRICS & GYNECOLOGY

## 2023-08-02 PROCEDURE — 250N000013 HC RX MED GY IP 250 OP 250 PS 637: Performed by: ANESTHESIOLOGY

## 2023-08-02 RX ORDER — ONDANSETRON 4 MG/1
4 TABLET, FILM COATED ORAL EVERY 8 HOURS PRN
Qty: 12 TABLET | Refills: 0 | Status: SHIPPED | OUTPATIENT
Start: 2023-08-02 | End: 2024-01-26

## 2023-08-02 RX ORDER — ONDANSETRON 2 MG/ML
4 INJECTION INTRAMUSCULAR; INTRAVENOUS EVERY 30 MIN PRN
Status: DISCONTINUED | OUTPATIENT
Start: 2023-08-02 | End: 2023-08-02 | Stop reason: HOSPADM

## 2023-08-02 RX ORDER — ONDANSETRON 2 MG/ML
INJECTION INTRAMUSCULAR; INTRAVENOUS PRN
Status: DISCONTINUED | OUTPATIENT
Start: 2023-08-02 | End: 2023-08-02

## 2023-08-02 RX ORDER — CEFAZOLIN SODIUM/WATER 2 G/20 ML
2 SYRINGE (ML) INTRAVENOUS SEE ADMIN INSTRUCTIONS
Status: DISCONTINUED | OUTPATIENT
Start: 2023-08-02 | End: 2023-08-02 | Stop reason: HOSPADM

## 2023-08-02 RX ORDER — GLYCOPYRROLATE 0.2 MG/ML
INJECTION, SOLUTION INTRAMUSCULAR; INTRAVENOUS PRN
Status: DISCONTINUED | OUTPATIENT
Start: 2023-08-02 | End: 2023-08-02

## 2023-08-02 RX ORDER — LIDOCAINE HYDROCHLORIDE 20 MG/ML
INJECTION, SOLUTION INFILTRATION; PERINEURAL PRN
Status: DISCONTINUED | OUTPATIENT
Start: 2023-08-02 | End: 2023-08-02

## 2023-08-02 RX ORDER — ACETAMINOPHEN 325 MG/1
975 TABLET ORAL ONCE
Status: COMPLETED | OUTPATIENT
Start: 2023-08-02 | End: 2023-08-02

## 2023-08-02 RX ORDER — KETAMINE HYDROCHLORIDE 10 MG/ML
INJECTION INTRAMUSCULAR; INTRAVENOUS PRN
Status: DISCONTINUED | OUTPATIENT
Start: 2023-08-02 | End: 2023-08-02

## 2023-08-02 RX ORDER — LIDOCAINE 40 MG/G
CREAM TOPICAL
Status: DISCONTINUED | OUTPATIENT
Start: 2023-08-02 | End: 2023-08-02 | Stop reason: HOSPADM

## 2023-08-02 RX ORDER — HYDROMORPHONE HCL IN WATER/PF 6 MG/30 ML
0.4 PATIENT CONTROLLED ANALGESIA SYRINGE INTRAVENOUS EVERY 5 MIN PRN
Status: DISCONTINUED | OUTPATIENT
Start: 2023-08-02 | End: 2023-08-02 | Stop reason: HOSPADM

## 2023-08-02 RX ORDER — PROPOFOL 10 MG/ML
INJECTION, EMULSION INTRAVENOUS PRN
Status: DISCONTINUED | OUTPATIENT
Start: 2023-08-02 | End: 2023-08-02

## 2023-08-02 RX ORDER — HYDROMORPHONE HCL IN WATER/PF 6 MG/30 ML
0.2 PATIENT CONTROLLED ANALGESIA SYRINGE INTRAVENOUS EVERY 5 MIN PRN
Status: DISCONTINUED | OUTPATIENT
Start: 2023-08-02 | End: 2023-08-02 | Stop reason: HOSPADM

## 2023-08-02 RX ORDER — ONDANSETRON 4 MG/1
4 TABLET, ORALLY DISINTEGRATING ORAL ONCE
Status: COMPLETED | OUTPATIENT
Start: 2023-08-02 | End: 2023-08-02

## 2023-08-02 RX ORDER — SODIUM CHLORIDE, SODIUM LACTATE, POTASSIUM CHLORIDE, CALCIUM CHLORIDE 600; 310; 30; 20 MG/100ML; MG/100ML; MG/100ML; MG/100ML
INJECTION, SOLUTION INTRAVENOUS CONTINUOUS
Status: DISCONTINUED | OUTPATIENT
Start: 2023-08-02 | End: 2023-08-02 | Stop reason: HOSPADM

## 2023-08-02 RX ORDER — CEFAZOLIN SODIUM/WATER 2 G/20 ML
2 SYRINGE (ML) INTRAVENOUS
Status: COMPLETED | OUTPATIENT
Start: 2023-08-02 | End: 2023-08-02

## 2023-08-02 RX ORDER — DEXAMETHASONE SODIUM PHOSPHATE 4 MG/ML
INJECTION, SOLUTION INTRA-ARTICULAR; INTRALESIONAL; INTRAMUSCULAR; INTRAVENOUS; SOFT TISSUE PRN
Status: DISCONTINUED | OUTPATIENT
Start: 2023-08-02 | End: 2023-08-02

## 2023-08-02 RX ORDER — NEOSTIGMINE METHYLSULFATE 1 MG/ML
VIAL (ML) INJECTION PRN
Status: DISCONTINUED | OUTPATIENT
Start: 2023-08-02 | End: 2023-08-02

## 2023-08-02 RX ORDER — IBUPROFEN 600 MG/1
600 TABLET, FILM COATED ORAL EVERY 6 HOURS PRN
Qty: 30 TABLET | Refills: 0 | Status: SHIPPED | OUTPATIENT
Start: 2023-08-02

## 2023-08-02 RX ORDER — FENTANYL CITRATE 50 UG/ML
INJECTION, SOLUTION INTRAMUSCULAR; INTRAVENOUS PRN
Status: DISCONTINUED | OUTPATIENT
Start: 2023-08-02 | End: 2023-08-02

## 2023-08-02 RX ORDER — KETOROLAC TROMETHAMINE 30 MG/ML
INJECTION, SOLUTION INTRAMUSCULAR; INTRAVENOUS PRN
Status: DISCONTINUED | OUTPATIENT
Start: 2023-08-02 | End: 2023-08-02

## 2023-08-02 RX ORDER — OXYCODONE HYDROCHLORIDE 5 MG/1
5 TABLET ORAL EVERY 6 HOURS PRN
Qty: 10 TABLET | Refills: 0 | Status: SHIPPED | OUTPATIENT
Start: 2023-08-02

## 2023-08-02 RX ORDER — PHENAZOPYRIDINE HYDROCHLORIDE 200 MG/1
200 TABLET, FILM COATED ORAL ONCE
Status: COMPLETED | OUTPATIENT
Start: 2023-08-02 | End: 2023-08-02

## 2023-08-02 RX ORDER — SCOLOPAMINE TRANSDERMAL SYSTEM 1 MG/1
1 PATCH, EXTENDED RELEASE TRANSDERMAL ONCE
Status: COMPLETED | OUTPATIENT
Start: 2023-08-02 | End: 2023-08-02

## 2023-08-02 RX ORDER — FENTANYL CITRATE 50 UG/ML
50 INJECTION, SOLUTION INTRAMUSCULAR; INTRAVENOUS EVERY 5 MIN PRN
Status: DISCONTINUED | OUTPATIENT
Start: 2023-08-02 | End: 2023-08-02 | Stop reason: HOSPADM

## 2023-08-02 RX ORDER — BUPIVACAINE HYDROCHLORIDE 5 MG/ML
INJECTION, SOLUTION EPIDURAL; INTRACAUDAL PRN
Status: DISCONTINUED | OUTPATIENT
Start: 2023-08-02 | End: 2023-08-02 | Stop reason: HOSPADM

## 2023-08-02 RX ORDER — PROPOFOL 10 MG/ML
INJECTION, EMULSION INTRAVENOUS CONTINUOUS PRN
Status: DISCONTINUED | OUTPATIENT
Start: 2023-08-02 | End: 2023-08-02

## 2023-08-02 RX ORDER — ONDANSETRON 4 MG/1
4 TABLET, ORALLY DISINTEGRATING ORAL EVERY 30 MIN PRN
Status: DISCONTINUED | OUTPATIENT
Start: 2023-08-02 | End: 2023-08-02 | Stop reason: HOSPADM

## 2023-08-02 RX ORDER — OXYCODONE HYDROCHLORIDE 5 MG/1
5 TABLET ORAL EVERY 6 HOURS PRN
Status: DISCONTINUED | OUTPATIENT
Start: 2023-08-02 | End: 2023-08-02 | Stop reason: HOSPADM

## 2023-08-02 RX ORDER — FENTANYL CITRATE 50 UG/ML
25 INJECTION, SOLUTION INTRAMUSCULAR; INTRAVENOUS EVERY 5 MIN PRN
Status: DISCONTINUED | OUTPATIENT
Start: 2023-08-02 | End: 2023-08-02 | Stop reason: HOSPADM

## 2023-08-02 RX ORDER — SODIUM CHLORIDE, SODIUM LACTATE, POTASSIUM CHLORIDE, CALCIUM CHLORIDE 600; 310; 30; 20 MG/100ML; MG/100ML; MG/100ML; MG/100ML
INJECTION, SOLUTION INTRAVENOUS CONTINUOUS PRN
Status: DISCONTINUED | OUTPATIENT
Start: 2023-08-02 | End: 2023-08-02

## 2023-08-02 RX ADMIN — FENTANYL CITRATE 50 MCG: 50 INJECTION, SOLUTION INTRAMUSCULAR; INTRAVENOUS at 10:47

## 2023-08-02 RX ADMIN — HYDROMORPHONE HYDROCHLORIDE 0.5 MG: 1 INJECTION, SOLUTION INTRAMUSCULAR; INTRAVENOUS; SUBCUTANEOUS at 09:42

## 2023-08-02 RX ADMIN — FENTANYL CITRATE 50 MCG: 50 INJECTION, SOLUTION INTRAMUSCULAR; INTRAVENOUS at 11:14

## 2023-08-02 RX ADMIN — FENTANYL CITRATE 50 MCG: 50 INJECTION, SOLUTION INTRAMUSCULAR; INTRAVENOUS at 10:41

## 2023-08-02 RX ADMIN — MIDAZOLAM 2 MG: 1 INJECTION INTRAMUSCULAR; INTRAVENOUS at 07:37

## 2023-08-02 RX ADMIN — OXYCODONE HYDROCHLORIDE 5 MG: 5 TABLET ORAL at 11:16

## 2023-08-02 RX ADMIN — KETOROLAC TROMETHAMINE 30 MG: 30 INJECTION, SOLUTION INTRAMUSCULAR at 09:42

## 2023-08-02 RX ADMIN — PROPOFOL 100 MCG/KG/MIN: 10 INJECTION, EMULSION INTRAVENOUS at 07:57

## 2023-08-02 RX ADMIN — PHENYLEPHRINE HYDROCHLORIDE 100 MCG: 10 INJECTION INTRAVENOUS at 08:01

## 2023-08-02 RX ADMIN — SODIUM CHLORIDE, POTASSIUM CHLORIDE, SODIUM LACTATE AND CALCIUM CHLORIDE: 600; 310; 30; 20 INJECTION, SOLUTION INTRAVENOUS at 06:24

## 2023-08-02 RX ADMIN — SODIUM CHLORIDE, POTASSIUM CHLORIDE, SODIUM LACTATE AND CALCIUM CHLORIDE: 600; 310; 30; 20 INJECTION, SOLUTION INTRAVENOUS at 08:17

## 2023-08-02 RX ADMIN — ACETAMINOPHEN 975 MG: 325 TABLET, FILM COATED ORAL at 06:46

## 2023-08-02 RX ADMIN — LIDOCAINE HYDROCHLORIDE 20 MG: 20 INJECTION, SOLUTION INFILTRATION; PERINEURAL at 07:59

## 2023-08-02 RX ADMIN — SODIUM CHLORIDE, POTASSIUM CHLORIDE, SODIUM LACTATE AND CALCIUM CHLORIDE: 600; 310; 30; 20 INJECTION, SOLUTION INTRAVENOUS at 06:00

## 2023-08-02 RX ADMIN — FENTANYL CITRATE 100 MCG: 50 INJECTION, SOLUTION INTRAMUSCULAR; INTRAVENOUS at 07:44

## 2023-08-02 RX ADMIN — GLYCOPYRROLATE 0.2 MG: 0.2 INJECTION, SOLUTION INTRAMUSCULAR; INTRAVENOUS at 08:16

## 2023-08-02 RX ADMIN — PHENAZOPYRIDINE 200 MG: 200 TABLET ORAL at 06:46

## 2023-08-02 RX ADMIN — LIDOCAINE HYDROCHLORIDE 30 MG: 20 INJECTION, SOLUTION INFILTRATION; PERINEURAL at 07:44

## 2023-08-02 RX ADMIN — ROCURONIUM BROMIDE 50 MG: 50 INJECTION, SOLUTION INTRAVENOUS at 07:44

## 2023-08-02 RX ADMIN — DEXAMETHASONE SODIUM PHOSPHATE 8 MG: 4 INJECTION, SOLUTION INTRA-ARTICULAR; INTRALESIONAL; INTRAMUSCULAR; INTRAVENOUS; SOFT TISSUE at 07:44

## 2023-08-02 RX ADMIN — PROPOFOL 200 MG: 10 INJECTION, EMULSION INTRAVENOUS at 07:44

## 2023-08-02 RX ADMIN — SODIUM CHLORIDE, POTASSIUM CHLORIDE, SODIUM LACTATE AND CALCIUM CHLORIDE: 600; 310; 30; 20 INJECTION, SOLUTION INTRAVENOUS at 10:58

## 2023-08-02 RX ADMIN — ACETAMINOPHEN 975 MG: 325 TABLET, FILM COATED ORAL at 13:58

## 2023-08-02 RX ADMIN — GLYCOPYRROLATE 0.2 MG: 0.2 INJECTION, SOLUTION INTRAMUSCULAR; INTRAVENOUS at 07:44

## 2023-08-02 RX ADMIN — FENTANYL CITRATE 50 MCG: 50 INJECTION, SOLUTION INTRAMUSCULAR; INTRAVENOUS at 10:56

## 2023-08-02 RX ADMIN — GLYCOPYRROLATE 0.4 MG: 0.2 INJECTION, SOLUTION INTRAMUSCULAR; INTRAVENOUS at 09:48

## 2023-08-02 RX ADMIN — ONDANSETRON 4 MG: 4 TABLET, ORALLY DISINTEGRATING ORAL at 13:59

## 2023-08-02 RX ADMIN — SCOPALAMINE 1 PATCH: 1 PATCH, EXTENDED RELEASE TRANSDERMAL at 08:00

## 2023-08-02 RX ADMIN — Medication 2 G: at 07:37

## 2023-08-02 RX ADMIN — ONDANSETRON 4 MG: 2 INJECTION INTRAMUSCULAR; INTRAVENOUS at 08:12

## 2023-08-02 RX ADMIN — NEOSTIGMINE METHYLSULFATE 3 MG: 1 INJECTION, SOLUTION INTRAVENOUS at 09:48

## 2023-08-02 RX ADMIN — Medication 30 MG: at 07:57

## 2023-08-02 RX ADMIN — HYDROMORPHONE HYDROCHLORIDE 0.5 MG: 1 INJECTION, SOLUTION INTRAMUSCULAR; INTRAVENOUS; SUBCUTANEOUS at 08:07

## 2023-08-02 ASSESSMENT — ACTIVITIES OF DAILY LIVING (ADL)
ADLS_ACUITY_SCORE: 35

## 2023-08-02 NOTE — DISCHARGE INSTRUCTIONS
DR. JOSÉ MIGUEL HAYES M.D.   CLINIC PHONE NUMBER [phone].  [facility]    Maximum acetaminophen (Tylenol) dose from all sources should not exceed 4 grams (4000 mg) per day.  You last had 950 mg at 6:50 AM; do not take Tylenol products again until after 12:50 PM if needed.      Maximum ibuprofen (Advil) dose from all sources should not exceed 2.5 grams (2,400 mg) per day. You received Toradol, an IV form of Ibuprofen (Motrin) at 9:45 AM.  Do not take any Ibuprofen products until 3:45 PM or after.     HYSTERECTOMY DISCHARGE INSTRUCTIONS    WHAT TO EXPECT THE FIRST 24 HOURS:  -  You may have ice chips right after surgery.    -  You may start eating and drinking as soon as you feel ready.  This is usually about 2 hours after surgery.  We encourage you to drink water or other liquids to stay hydrated.    -  We will remove your urine catheter in the first 2 hours, if you have one.  You will be able to urinate on your own.    -  We will give you medicine to help with any pain or cramping.  You may also ask for medicine for itching, nausea and shivering.    -  We will instruct you on when to remove your bandage and how to care for your surgical cut (if you have one) at home.    -  You may begin showering the day after surgery or as directed by your surgeon.    -  You should be walking at least 3 times a day.    AFTER YOU'RE HOME:   -  Call your surgeon's office with any questions or if you need support.    -  Rest often.    -  Drink plenty of water (the amount you normally need to feel hydrated).  For many people this is 8 glasses a day.  Drinks with caffeine are okay in moderate amounts.    -  Try to take at least 3 short walks each day or as much as you are able.  Slowly increase your activity each day.    -  It is okay to climb stairs, but use the handrail in case you get dizzy.    -  Follow your surgeon's advice on when to return to normal activities and to work.    -  If you were given an incentive spirometer:   Try to cough, breathe deeply and use your breathing device (spirometer) every 15 to 30 minutes when awake.  This will help prevent breathing problems and fevers.    INCISION CARE:  -  The area around your incision may be numb.  This should go away over several months.    -  Keep the incision dry and covered for the first 24 hours.    -  Remove your bandage after 24 hours, even if you have some drainage.  Leave your skin open to the air.  It is okay to cover it during the day if your clothing rubs against it.    -  If you have steri-strips (small pieces of tape) across the incision, leave them in place.  They will fall off on their own.  If they are still in place after a week, you can remove them.    -  Don't use ointments, oils, lotions or creams on your incision unless told to use them.    -  Avoid nicotine (smoking, vaping) if possible.  Nicotine can slow healing.    BATHING AND HYGIENE:  Once your surgeon says you have shower, please follow these tips:  -  Shower daily.  Gently soap your belly and let the soapy water run over the incision.  Don't rub.    -  Pat to dry.  Dry all areas fully, including any folds in the belly area.    -  You don't need to re-cover the wound.    PAIN:  It is common to have some pain and cramping when you go home.  -  Take pain medicine as needed.  Do not wait for the pain to become strong before taking pain medicine.    -  Follow the directions that came with your pain medicine.    -  Take acetaminophen (Tylenol), ibuprofen (Advil, Motrin), or naprosyn (Aleve) with food and a full glass of water.  This will reduce stomach upset.    While taking narcotic (opioid) pain medicine:  -  Don't take pain medicine if you have no pain or your pain is mild and tolerable.    -  Don't drive or use heavy machinery.  You may re-start driving and operating heavy machinery after you have stopped taking narcotics and you feel safe to do so.    -  Don't make important or legal decisions.    -  If you  have nausea, vomiting or a rash, stop the medicine and call you doctor.    -  Opioid pain medicines can cause constipation.    When coughing or sneezing, you may want to hug a pillow for added support if you had surgery on your belly.  This may reduce pain.    If you had laparoscopic surgery:  You may feel some mild pain in your belly, chest or shoulder within the first 48 hours.  This is due to the gas (CO2) used during the surgery.  This pain will pass quickly as the gas is absorbed.  For relief, take your pain medicine and lie flat.    DIET:  -  You may eat your normal diet unless told otherwise.    -  Foods that are high in protein (fish, meat, poultry, soy, dairy and beans) may help you heal faster.    -  Foods that are high in fiber (prunes, vegetables, fruits and grains) can help prevent constipation (trouble pooping).  Constipation is common after surgery, especially if you take opioid pain medicines.    CONSTIPATION:  If you become constipated (trouble pooping), try the options listed below as needed.  -  Take stool softeners as needed, such as Colace.    -  Milk of magnesia:  30 mL (2 tablespoons) twice a day.    -  Metamucil:  2 tablespoons mixed with 12 ounces of liquid.    If you're told to use a laxative, try the following options:  -  Senokot-S    -  Dulcolax oral or suppository    -  Miralax every day as needed    You can stop if you are pooping regularly or if you start having diarrhea (watery poop).  Call our office if you have not had a bowel movement for 2 days.    FOLLOW-UP VISITS:  You may need to see your surgeon for a check-up.  This varies by the type of surgery you had.  If you need to return, your surgeon will let you know when.  Please call your surgeon's office to make a follow-up appointment if not already done.    CALL YOUR DOCTOR IF YOU HAVE:  SEVERE CHILLS AND FEVER .4 DEGREES FAHRENHEIT OR HIGHER, TAKEN UNDER THE TONGUE.   BRIGHT RED BLOOD OR LARGE CLOTS COMING OUT OF THE  VAGINA--ENOUGH TO SOAK ONE PAD IN AN HOUR.  INCREASED PAIN, WARMTH, SWELLING, REDNESS, BLEEDING OR FLUID LEAKING FROM THE SURGERY SITE.  URINE OR VAGINAL FLUID THAT SMELLS BAD.  YOU CANNOT URINATE (USE THE TOILET), IT BURNS WHEN YOU URINATE OR YOU NEED TO GO MORE OFTEN.  SEVERE NAUSEA (FEELING SICK TO YOUR STOMACH) OR VOMITING (THROWING UP).  INCREASED PAIN THAT YOU CANNOT CONTROL WITH MEDICINE.              GENERAL ANESTHESIA OR SEDATION ADULT DISCHARGE INSTRUCTIONS   SPECIAL PRECAUTIONS FOR 24 HOURS AFTER SURGERY    IT IS NOT UNUSUAL TO FEEL LIGHT-HEADED OR FAINT, UP TO 24 HOURS AFTER SURGERY OR WHILE TAKING PAIN MEDICATION.  IF YOU HAVE THESE SYMPTOMS; SIT FOR A FEW MINUTES BEFORE STANDING AND HAVE SOMEONE ASSIST YOU WHEN YOU GET UP TO WALK OR USE THE BATHROOM.    YOU SHOULD REST AND RELAX FOR THE NEXT 24 HOURS AND YOU MUST MAKE ARRANGEMENTS TO HAVE SOMEONE STAY WITH YOU FOR AT LEAST 24 HOURS AFTER YOUR DISCHARGE.  AVOID HAZARDOUS AND STRENUOUS ACTIVITIES.  DO NOT MAKE IMPORTANT DECISIONS FOR 24 HOURS.    DO NOT DRIVE ANY VEHICLE OR OPERATE MECHANICAL EQUIPMENT FOR 24 HOURS FOLLOWING THE END OF YOUR SURGERY.  EVEN THOUGH YOU MAY FEEL NORMAL, YOUR REACTIONS MAY BE AFFECTED BY THE MEDICATION YOU HAVE RECEIVED.    DO NOT DRINK ALCOHOLIC BEVERAGES FOR 24 HOURS FOLLOWING YOUR SURGERY.    DRINK CLEAR LIQUIDS (APPLE JUICE, GINGER ALE, 7-UP, BROTH, ETC.).  PROGRESS TO YOUR REGULAR DIET AS YOU FEEL ABLE.    YOU MAY HAVE A DRY MOUTH, A SORE THROAT, MUSCLES ACHES OR TROUBLE SLEEPING.  THESE SHOULD GO AWAY AFTER 24 HOURS.    CALL YOUR DOCTOR FOR ANY OF THE FOLLOWING:  SIGNS OF INFECTION (FEVER, GROWING TENDERNESS AT THE SURGERY SITE, A LARGE AMOUNT OF DRAINAGE OR BLEEDING, SEVERE PAIN, FOUL-SMELLING DRAINAGE, REDNESS OR SWELLING.    IT HAS BEEN OVER 8 TO 10 HOURS SINCE SURGERY AND YOU ARE STILL NOT ABLE TO URINATE (PASS WATER).       Remove Scope Patch: Tomorrow, August 3rd at 5 PM    How to Remove Scope Patch:  When the  scopolamine patch is no longer needed, remove the patch and fold it in half with the sticky side together and dispose of it.  Wash your hands and the area behind your ear thoroughly with soap and water to remove any traces of scopolamine from the area.  Make sure not to touch your eyes after handling the scope patch and wash your hands thoroughly.

## 2023-08-02 NOTE — OP NOTE
Operative Report    PREOPERATIVE DIAGNOSIS:  Severe endometriosis, Chronic pelvic pain, Dysmenorrhea    POSTOPERATIVE DIAGNOSES:  Same  PROCEDURE: Robotic-assisted laparoscopic total hysterectomy, Left oophorectomy, Cystoscopy  SURGEON: Jeffrey Bansal MD   ASSISTANT: Tam[facility] Singh [facility]   ANESTHESIA: General endotracheal anesthesia.   FLUIDS: 1600 cc of lactated Ringers, Ancef 2 gram(s)  IV preop.   ESTIMATED BLOOD LOSS: 30 cc.   DRAINS: Pappas catheter putting out 150 cc clear Pyridium-colored urine during the procedure.   INDICATIONS FOR PROCEDURE:  Patient  is a 42 year old woman ,17,3, who has had severe chronic pelvic pain and dysmenorrhea for years that has been unamenable to medical management.   She has had a prior Laparoscopic bilateral salpingectomy and right oophorectomy showing severe endometriosis.  She wants to undergo definitive therapy via robotic-assisted total laparoscopic hysterectomy with left oophorectomy as definitive therapy.  She also understands she will become prematurely menopausal with all the effects from that.  She understands she will probably need HRT at some point for menopausal symptoms and reduction of sequelae from premature menopause.  As to the route of the procedure, she wants the robotic-assisted approach due to the severity of her endometriosis and potential for adhesions, and this will maximize her chances of avoiding abdominal hysterectomy.  She understands that the risks of the procedure includes bleeding, infection, injury to the bowel, bladder, ureters,  nerves, blood vessels, and any other intra-abdominal or pelvic organs and the risks of general anesthesia including aspiration, malignant hypothermia and death.  She accepts all these risks and factors in the decisionmaking process of proceeding with this procedure and she has given informed consent.   FINDINGS: On pelvic exam under anesthesia, the uterus is normal size, shape, contour. The left adnexa is  without masses. On robotic-assisted laparoscopy, the uterus was noted to be normal size and shape with no obvious myomas however there is a 1 cm firm nodule on lower uterine segment consistent with endometriosis, and the left ovary was densely adherent to the left pelvic sidewall and left side of the uterus due to endometriosis. The right ovary and both tubes are surgically absent.The cul-de-sac had no obvious endometriosis.  The appendix was not visualized. The upper abdomen was normal.   SPECIMENS: Uterus and cervix, both tubes and ovaries, total weight was < 250 g.   PROCEDURE IN DETAIL: After proper patient identification and consent for procedure was obtained, the patient was taken to the operating room where adequate general endotracheal anesthesia was obtained. The patient was placed in dorsal lithotomy position with legs in Guillermo stirrups and positioned appropriately for robotic-assisted hysterectomy. She was prepped and draped in the usual sterile manner for this procedure.   A Pappas catheter was placed. A single arm speculum was placed in the vagina to visualize the cervix, which was then grasped at 12 o'clock with a single-tooth tenaculum for downward traction of the uterus. The cervix was gently dilated up to a #8 Hegar dilator.  A extra-large V-Care was inserted through the endocervical canal into the uterine cavity, and the balloon was inflated with saline. The tenaculum and speculum were removed.  The V-Care cup was advanced to the fornices, and the locking cup was advanced and locked into place behind the cup.  Attention was then turned to the abdomen.   With abdominal wall tenting, a Veress needle was inserted through the umbilicus into the peritoneal cavity.  Low flow insufflation with CO2 was begun with opening pressure of 5 mmHg.  After 1 liter was insufflated with low flow, high flow insufflation completed the process for a total of 2.5 L,  The Veress needle was removed.  An 8 mm incision was  placed approximately 3 cm above the umbilicus in the midline transversely and then the 8-mm robotic camera port was inserted under direct visualization with the 5 mm laparoscope using the Optiview trocar into the peritoneal cavity without difficulty.  The initial assessment revealed the above noted findings.   A 5 mm skin incision was made with the knife on the right lateral abdomen for the assistant port.   A 5 mm AirSeal port was inserted under direct visualization with the laparoscope without trauma to underlying viscera.  The trocar was removed. There were no intraabdominal adhesions underneath this port site as well as no injury to the abdominal viscera.  The scope was placed through the camera port.  The left and right 8 mm robotic ports were then placed after determining proper position for each port. An 8 mm skin incision was made with a knife bilaterally and the ports were placed under direct visualization with the laparoscope.    Once all ports were placed correctly as per the [employer] specifications, the [employer] surgical robot was then moved into position and docked to the camera and robotic arm ports. The camera was then placed into the central port and a SynchroSeal vessel sealer was placed in the left port and a monopolar scissor was placed in the right port.   At this point I assumed my position at the console, and the ureters were identified bilaterally and avoided during the procedure.  The left infundibulopelvic ligament was divided using the SynchroSeal. The uterus was always elevated cranially with one of the assistants elevating the V-Care cup to keep the ureters out of harm's way.  The ovary was bluntly and sharply dissected from the adhesions to the pelvic sidewall.  The round ligaments were sealed and divided bilaterally.  This allowed opening of the broad ligament and access to the retroperitoneal space.  The anterior leaf of the broad ligament was then divided using monopolar cautery down to  the level of the bladder flap. The 2 sides were connected in the central portion and the bladder flap was developed using monopolar cautery keeping it well away from the actual bladder.   Once the bladder was well below the V-Care cup assuring that the bladder and ureters were out of harm's way, the uterine vessels were sealed and divided along the uterine sidewall.   Colpotomy was performed using the monopolar scissors and cutting current resulting in amputation of the cervix from the vaginal vault, while still preserving some minimal vaginal bleeding from the cuff edges to maintain good vascularization of the tissues.  The uterus, and left ovary was brought out by the assistant into the vagina and removed. A packing was placed in the vagina to maintain pneumoperitoneum.   The vaginal cuff was then closed using a 2-0 V-Loc 180 suture in a running horizontal manner starting from the right apex and going across to the left apex and over-sewing it back re-approximating the endopelvic fascial layer the full width of the cuff to make a full 2-layer closure.  The stitch continued back again for 2 throws to anchor it.  The pelvis was irrigated with normal saline and cleared of any clots and debris. All pedicles and the vaginal cuff were noted to be hemostatic after depressurizing the abdomen to 8 mmHg. A swatch of Interceed was placed over the vaginal cuff, covering the barbed suture line as well as surrounding tissues.   At this point, all pedicles were still hemostatic and so all the robot ports were undocked, and the robot was moved away from the operative field.  The pneumoperitoneum was released from the abdomen and all ports were removed under direct visualization with the laparoscope. All skin incisions were reapproximated with 4-0 Vicryl subcuticular simple sutures. 0.5% Marcaine was infiltrated into all port sites for postop analgesia. Steri-Strips were placed.   Attention was turned to the cystoscopy.  This was  indicated to confirm ureteral integrity after having to dissect the left ovary off the pelvic sidewall.  The vaginal packing and Pappas were removed.  The external sheath and obturator were inserted through the urethra into the bladder.  The obturator was removed.  The 70 degree cystoscope was placed through the sheath using saline as distention medium.  Both ureteral orifices were seen and effluxed Pyridium-stained urine confirming both are patent and intact.  The rest of the bladder was also intact with no suture present.  The cystoscope was removed after the bladder was drained.  The patient tolerated the procedure well. Sponge, instrument and needle counts were correct x3. The patient was taken to the recovery room and extubated in stable condition.     Jeffrey Bansal MD

## 2023-08-02 NOTE — ANESTHESIA POSTPROCEDURE EVALUATION
Patient: Charity Duarte    Procedure: Procedure(s):  ROBOT-ASSISTED TOTAL LAPAROSCOPIC HYSTERECTOMY, WITH LEFT OOPHORECTOMY, and cystoscopy       Anesthesia Type:  General    Note:  Disposition: Outpatient   Postop Pain Control: Uneventful            Sign Out: Well controlled pain   PONV: No   Neuro/Psych: Uneventful            Sign Out: Acceptable/Baseline neuro status   Airway/Respiratory: Uneventful            Sign Out: Acceptable/Baseline resp. status   CV/Hemodynamics: Uneventful            Sign Out: Acceptable CV status; No obvious hypovolemia; No obvious fluid overload   Other NRE: NONE   DID A NON-ROUTINE EVENT OCCUR? No           Last vitals:  Vitals Value Taken Time   BP 99/69 08/02/23 1205   Temp 97  F (36.1  C) 08/02/23 1150   Pulse 67 08/02/23 1214   Resp 10 08/02/23 1214   SpO2 98 % 08/02/23 1214   Vitals shown include unvalidated device data.    Electronically Signed By: Lawrence Orourke MD  August 2, 2023  1:50 PM

## 2023-08-02 NOTE — ANESTHESIA PREPROCEDURE EVALUATION
Anesthesia Pre-Procedure Evaluation    Patient: Charity Duarte   MRN: [id number] : 1980        Procedure : Procedure(s):  ROBOT-ASSISTED TOTAL LAPAROSCOPIC HYSTERECTOMY, WITH LEFT OOPHORECTOMY          Past Medical History:   Diagnosis Date    Anxiety state, unspecified     Arthritis     Miscarriage     multiple in past    Motion sickness       Past Surgical History:   Procedure Laterality Date    DILATION AND CURETTAGE, OPERATIVE HYSTEROSCOPY WITH MORCELLATOR, COMBINED N/A 8/3/2021    Procedure: operative hysteroscopy with intrauterine morcellator, dilation and curettage;  Surgeon: Jeffrey Bansal MD;  Location: [facility] OR    DILATION AND CURETTAGE, OPERATIVE HYSTEROSCOPY, COMBINED  2021    Laparoscopic right salpingo-oophorectomy, Left salpingectomy, Hysteroscopic endometrial polypectomy with intrauterine morcellator, Dilatation and curettage - Path right ovarian endometrioma, normal tubes, benign endometrial polyp, benign endometrium    ENT SURGERY      wisdom tooth extraction    LAPAROSCOPIC SALPINGECTOMY Left 2021    Laparoscopic right salpingo-oophorectomy, Left salpingectomy, Hysteroscopic endometrial polypectomy with intrauterine morcellator, Dilatation and curettage - Path right ovarian endometrioma, normal tubes, benign endometrial polyp, benign endometrium    LAPAROSCOPIC SALPINGO-OOPHORECTOMY Right 2021    Laparoscopic right salpingo-oophorectomy, Left salpingectomy, Hysteroscopic endometrial polypectomy with intrauterine morcellator, Dilatation and curettage - Path right ovarian endometrioma, normal tubes, benign endometrial polyp, benign endometrium    LAPAROSCOPIC SALPINGO-OOPHORECTOMY Bilateral 8/3/2021    Procedure: Laparoscopic bilateral salpingectomy, right salpingo oophorectomy;  Surgeon: Jeffrey Bansal MD;  Location: [facility] OR    ZZC STABISM SURG,PREV EYE SURG,NOT MUSC      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      Allergies   Allergen Reactions    Bees Anaphylaxis    Marijuana [Cannabis]  Anaphylaxis    Latex Swelling and Rash    Mold Cough    Pollen Extract Cough    Seasonal Allergies Cough     Sneezing, Coughing.      Social History     Tobacco Use    Smoking status: Every Day     Packs/day: 0.50     Types: Cigarettes     Passive exposure: Current    Smokeless tobacco: Never    Tobacco comments:     amount varies to stress   Substance Use Topics    Alcohol use: Not Currently     Comment: Rare. Special occasion.  last drink was 2 years ago.      Wt Readings from Last 1 Encounters:   08/02/23 83.9 kg (184 lb 14.4 oz)        Anesthesia Evaluation   Pt has had prior anesthetic. Type: General.    No history of anesthetic complications       ROS/MED HX  ENT/Pulmonary:  - neg pulmonary ROS     Neurologic:  - neg neurologic ROS     Cardiovascular:  - neg cardiovascular ROS     METS/Exercise Tolerance:     Hematologic:  - neg hematologic  ROS     Musculoskeletal:  - neg musculoskeletal ROS     GI/Hepatic:  - neg GI/hepatic ROS     Renal/Genitourinary:  - neg Renal ROS     Endo:     (+)               Obesity,       Psychiatric/Substance Use:     (+) psychiatric history anxiety       Infectious Disease:  - neg infectious disease ROS     Malignancy:       Other:            Physical Exam    Airway        Mallampati: III   TM distance: > 3 FB   Neck ROM: full   Mouth opening: > 3 cm    Respiratory Devices and Support         Dental       (+) Modest Abnormalities - crowns, retainers, 1 or 2 missing teeth      Cardiovascular   cardiovascular exam normal          Pulmonary   pulmonary exam normal                OUTSIDE LABS:  CBC:   Lab Results   Component Value Date    WBC 9.0 08/24/2018    WBC 8.2 06/20/2013    HGB 13.7 08/02/2023    HGB 14.9 07/21/2023    HCT 45.4 08/24/2018    HCT 45.5 06/20/2013     08/24/2018     06/20/2013     BMP:   Lab Results   Component Value Date     07/21/2023     07/02/2021    POTASSIUM 4.4 07/21/2023    POTASSIUM 4.2 07/02/2021    CHLORIDE 105 07/21/2023     CHLORIDE 106 07/02/2021    CO2 22 07/21/2023    CO2 27 07/02/2021    BUN 14.7 07/21/2023    BUN 15 07/02/2021    CR 0.69 07/21/2023    CR 0.78 07/02/2021    GLC 96 07/21/2023    GLC 98 07/02/2021     COAGS: No results found for: PTT, INR, FIBR  POC:   Lab Results   Component Value Date    HCG Negative 08/02/2023    HCGS Negative 05/19/2023     HEPATIC:   Lab Results   Component Value Date    ALBUMIN 4.1 08/24/2018    PROTTOTAL 7.5 08/24/2018    ALT 18 08/24/2018    AST 14 08/24/2018    ALKPHOS 78 08/24/2018    BILITOTAL 0.3 08/24/2018     OTHER:   Lab Results   Component Value Date    JACKIE 9.7 07/21/2023    TSH 1.02 08/24/2018       Anesthesia Plan    ASA Status:  3       Anesthesia Type: General.     - Airway: ETT   Induction: Intravenous.   Maintenance: Balanced.        Consents    Anesthesia Plan(s) and associated risks, benefits, and realistic alternatives discussed. Questions answered and patient/representative(s) expressed understanding.     - Discussed:     - Discussed with:  Patient      - Extended Intubation/Ventilatory Support Discussed: No.      - Patient is DNR/DNI Status: No     Use of blood products discussed: No .     Postoperative Care    Pain management: IV analgesics, Oral pain medications, Multi-modal analgesia.   PONV prophylaxis: Ondansetron (or other 5HT-3), Dexamethasone or Solumedrol     Comments:                Lawrence Orourke MD

## 2023-08-02 NOTE — PROGRESS NOTES
SPIRITUAL HEALTH SERVICES  [facility]. Pre-Op  PRE-SURGERY VISIT    Had pre-surgery visit with Charity,  Ashutosh, and son Antonio who spoke of being very anxious about his mom's procedure.  Provided spiritual support, prayer.     Radha Proctor MDiv  Staff   [facility] routine referrals *[phone]  [facility] available 24/7 for emergent requests/referrals, either by having the on-call  paged or by entering an ASAP/STAT consult in Epic (this will also page the on-call ).

## 2023-08-02 NOTE — ANESTHESIA CARE TRANSFER NOTE
Patient: Charity Duarte    Procedure: Procedure(s):  ROBOT-ASSISTED TOTAL LAPAROSCOPIC HYSTERECTOMY, WITH LEFT OOPHORECTOMY, and cystoscopy       Diagnosis: Dysmenorrhea [N94.6]  Chronic pelvic pain in female [R10.2, G89.29]  Endometriosis [N80.9]  Diagnosis Additional Information: No value filed.    Anesthesia Type:   General     Note:    Oropharynx: oral airway in place and spontaneously breathing  Level of Consciousness: drowsy  Oxygen Supplementation: face mask  Level of Supplemental Oxygen (L/min / FiO2): 6  Independent Airway: airway patency satisfactory and stable  Dentition: dentition unchanged  Vital Signs Stable: post-procedure vital signs reviewed and stable  Report to RN Given: handoff report given  Patient transferred to: PACU  Comments: Resting and comfy  Handoff Report: Identifed the Patient, Identified the Reponsible Provider, Reviewed the pertinent medical history, Discussed the surgical course, Reviewed Intra-OP anesthesia mangement and issues during anesthesia and Allowed opportunity for questions and acknowledgement of understanding      Vitals:  Vitals Value Taken Time   /64 08/02/23 1010   Temp 96.9  F (36.1  C) 08/02/23 1010   Pulse 53 08/02/23 1011   Resp 11 08/02/23 1011   SpO2 97 % 08/02/23 1011   Vitals shown include unvalidated device data.    Electronically Signed By: JOHN Lozada CRNA  August 2, 2023  10:13 AM

## 2023-08-03 ENCOUNTER — NURSE TRIAGE (OUTPATIENT)
Dept: NURSING | Facility: CLINIC | Age: 43
End: 2023-08-03
Payer: COMMERCIAL

## 2023-08-03 NOTE — TELEPHONE ENCOUNTER
Patient calling because she went to the toilet and patted herself dry and the toilet paper came away with (grayish tinted) suture materia with a knot in it. She states that she is having mild, spotty vaginal bleeding. Patient had Interceed placed over the vaginal cuff during her procedure.    Care advice given for patient to be seen within 2 weeks. Routed to Dr. KEITH Bansal as FARIBAI regarding suture material. Patient that patient would like a call to explain what the material is that came out of her vagina, and whether it should have come out.    Ophelia Orourke RN  [facility]  August 3, 2023, 6:48 PM    Reason for Disposition   All other vaginal symptoms  (Exception: feels like prior yeast infection, minor abrasion, mild rash < 24 hour duration, mild itching)    Additional Information   Negative: Sounds like a life-threatening emergency to the triager   Negative: Followed a genital area injury (e.g., vagina, vulva)   Negative: Foreign body in vagina (e.g., tampon)   Negative: Vaginal bleeding is main symptom   Negative: Vaginal discharge is main symptom   Negative: Pain or burning with passing urine (urination) is main symptom   Negative: Menstrual cramps is main symptom   Negative: Abdomen pain is main symptom   Negative: Pubic lice suspected   Negative: Itching or rash of external female genital area (vulva)   Negative: Labor suspected   Negative: Patient sounds very sick or weak to the triager   Negative: [1] SEVERE pain AND [2] not improved 2 hours after pain medicine   Negative: [1] Genital area looks infected (e.g., draining sore, spreading redness) AND [2] fever   Negative: [1] Something is hanging out of the vagina AND [2] can't easily be pushed back inside   Negative: MODERATE-SEVERE itching (i.e., interferes with school, work, or sleep)   Negative: [1] MILD-MODERATE pain AND [2] present > 24 hours (Exception: chronic pain)   Negative: Genital area looks infected (e.g., draining sore, spreading  "redness)   Negative: Rash with painful tiny water blisters   Negative: [1] Rash (e.g., redness, tiny bumps, sore) of genital area AND [2] present > 24 hours   Negative: Tender lump (swelling or \"ball\") at vaginal opening   Negative: [1] Symptoms of a yeast infection (i.e., itchy, white discharge, not bad smelling) AND [2] not improved > 3 days following CARE ADVICE   Negative: [1] Vaginal itching AND [2] not improved > 3 days following CARE ADVICE   Negative: [1] Vaginal odor (bad smell) AND [2] not improved > 3 days following CARE ADVICE   Negative: Patient is worried they have a sexually transmitted infection (STI)   Negative: Pain with sexual intercourse (dyspareunia)  (Exception: feels like prior yeast infection, minor abrasion, minor rash < 24 hour duration, mild itching)   Negative: Pain in genital area is a chronic symptom (recurrent or ongoing AND present > 4 weeks)   Negative: [1] Vaginal dryness or itching AND [2] nearing menopause or after menopause   Negative: Feels like something inside is falling out of vagina (e.g., pressure, heaviness, fullness)    Protocols used: Vaginal Symptoms-A-AH    "

## 2023-08-04 ENCOUNTER — OFFICE VISIT (OUTPATIENT)
Dept: OBGYN | Facility: CLINIC | Age: 43
End: 2023-08-04
Payer: COMMERCIAL

## 2023-08-04 VITALS
DIASTOLIC BLOOD PRESSURE: 80 MMHG | HEIGHT: 65 IN | SYSTOLIC BLOOD PRESSURE: 130 MMHG | BODY MASS INDEX: 31.65 KG/M2 | WEIGHT: 190 LBS

## 2023-08-04 DIAGNOSIS — G89.18 POSTOPERATIVE PAIN: Primary | ICD-10-CM

## 2023-08-04 PROCEDURE — 99024 POSTOP FOLLOW-UP VISIT: CPT | Performed by: OBSTETRICS & GYNECOLOGY

## 2023-08-04 RX ORDER — OXYCODONE HYDROCHLORIDE 5 MG/1
5 TABLET ORAL ONCE
Qty: 1 TABLET | Refills: 0 | Status: SHIPPED | OUTPATIENT
Start: 2023-08-04 | End: 2023-08-04

## 2023-08-04 NOTE — NURSING NOTE
"Chief Complaint   Patient presents with    Post-op Visit     Felt what feels like stiches when wiped       Initial /80 (BP Location: Right arm, Patient Position: Chair, Cuff Size: Adult Regular)   Ht 1.651 m (5' 5\")   Wt 86.2 kg (190 lb)   LMP 2023 (Exact Date)   BMI 31.62 kg/m   Estimated body mass index is 31.62 kg/m  as calculated from the following:    Height as of this encounter: 1.651 m (5' 5\").    Weight as of this encounter: 86.2 kg (190 lb).  BP completed using cuff size: regular    Questioned patient about current smoking habits.  Pt. has never smoked.          The following HM Due: NONE  Joan Reagan CMA    "

## 2023-08-04 NOTE — TELEPHONE ENCOUNTER
Spoke to pt.    Around 4am she went to the bathroom and saw another suture on the toilet paper. Light amount of blood when she patted herself dry.    So she saw sutures twice on toilet paper.  No increased bleeding. She has back pain today.    Advised he would likely want to see her in clinic, he is currently in the OR so we will get back to her as we hear back.       Ruthy JOHN RN BSN

## 2023-08-04 NOTE — TELEPHONE ENCOUNTER
I called and spoke w/ Pt:  She is not having VB like a flow, just spotting.  Also I advised Pt that there was no suture knots used in her vaginal closure because a V-Loc suture has a loop to secure it on the inside of the abdomen, and the rest is barbed and self-securing.  Nevertheless I want her to come see me at 3:00 PM sharp at the appt slot that is currently blocked off for a brief vaginal speculum exam to confirm all is okay before the weekend.  Please place her in the 3:00PM slot.  I told her we are squeezing her in so to try to be on time.

## 2023-08-04 NOTE — PROGRESS NOTES
"CC:  Here for post-op check-up.      HPI:  Procedure: Robotic-assisted laparoscopic total hysterectomy, Left oophorectomy, Cystoscopy   Date of procedure: 8/2/2023  Post-op course:  Pt called this AM c/o some blood w/ something that looked like suture with a knot passing earlier today.  VB is minimal.  Normal bowel and bladder habits.    Pathology:  A. Cervix, uterus, and left ovary, robotic-assisted total laparoscopic hysterectomy and left oophorectomy (185.5 grams):  Cervix:                -Benign squamous and endocervical mucosa.  Endometrium:  -Endometrial polyp.  Proliferative pattern endometrium.  Myometrium:  -Adenomyosis.  -Leiomyoma, 0.3 cm in greatest size.  Left ovary:   -Hemorrhagic cyst with hemosiderin-laden macrophages in the wall, suggestive of endometriotic cyst.  -Ovarian follicles and luteal cyst.    OBJECTIVE:   /80 (BP Location: Right arm, Patient Position: Chair, Cuff Size: Adult Regular)   Ht 1.651 m (5' 5\")   Wt 86.2 kg (190 lb)   LMP 07/24/2023 (Exact Date)   BMI 31.62 kg/m    Abdomen:  ND  Incisions:  4 laparoscopy sites w/ no erythema, covered w/ steristrips and bandaids  Pelvic:  Vaginal cuff healing well with no erythema, induration, or drainage. There is a small amount of old clot in vagina, but no suture materiall    ASSESSMENT:   Encounter Diagnosis   Name Primary?    Postoperative pain Yes         PLAN:      1)  I reassured Pt that the suture used (V-Loc) has no knots, and the anchor loop is inside the abdominal surface of the vagina.  There is no sx's of suture in the vagina.    2) Pt. to f/u for routine postop check in 6 weeks.      Jeffrey Bansal MD  [facility]    "

## 2023-09-15 ENCOUNTER — OFFICE VISIT (OUTPATIENT)
Dept: OBGYN | Facility: CLINIC | Age: 43
End: 2023-09-15
Payer: COMMERCIAL

## 2023-09-15 VITALS
WEIGHT: 180 LBS | BODY MASS INDEX: 29.99 KG/M2 | HEIGHT: 65 IN | DIASTOLIC BLOOD PRESSURE: 82 MMHG | SYSTOLIC BLOOD PRESSURE: 128 MMHG

## 2023-09-15 DIAGNOSIS — E89.40 SURGICAL MENOPAUSE ON HORMONE REPLACEMENT THERAPY: ICD-10-CM

## 2023-09-15 DIAGNOSIS — Z09 POSTOP CHECK: Primary | ICD-10-CM

## 2023-09-15 DIAGNOSIS — Z79.890 SURGICAL MENOPAUSE ON HORMONE REPLACEMENT THERAPY: ICD-10-CM

## 2023-09-15 DIAGNOSIS — N80.9 ENDOMETRIOSIS: ICD-10-CM

## 2023-09-15 PROBLEM — R10.2 CHRONIC PELVIC PAIN IN FEMALE: Status: RESOLVED | Noted: 2021-06-25 | Resolved: 2023-09-15

## 2023-09-15 PROBLEM — G89.29 CHRONIC PELVIC PAIN IN FEMALE: Status: RESOLVED | Noted: 2021-06-25 | Resolved: 2023-09-15

## 2023-09-15 PROBLEM — N94.6 DYSMENORRHEA: Status: RESOLVED | Noted: 2023-06-07 | Resolved: 2023-09-15

## 2023-09-15 PROCEDURE — 99024 POSTOP FOLLOW-UP VISIT: CPT | Performed by: OBSTETRICS & GYNECOLOGY

## 2023-09-15 RX ORDER — NORETHINDRONE ACETATE AND ETHINYL ESTRADIOL .5; 2.5 MG/1; UG/1
1 TABLET ORAL AT BEDTIME
Qty: 90 TABLET | Refills: 3 | Status: SHIPPED | OUTPATIENT
Start: 2023-09-15 | End: 2024-01-26

## 2023-09-15 NOTE — PROGRESS NOTES
CC:  Here for post-op check-up.      HPI:  Procedure: Robotic-assisted laparoscopic total hysterectomy, Left oophorectomy, Cystoscopy   Date of procedure: 8/2/2023  Post-op course:  Uncomplicated thus far.  Normal bowel and bladder habits.  No vaginal bleeding or drainage.  Her pelvic pain and low back pain are improved after her surgery.  She is having the expected hot flashes.    Pathology:  Cervix, uterus, and left ovary, robotic-assisted total laparoscopic hysterectomy and left oophorectomy (185.5 grams):  Cervix:                -Benign squamous and endocervical mucosa.  Endometrium:  -Endometrial polyp.  Proliferative pattern endometrium.  Myometrium:  -Adenomyosis.  -Leiomyoma, 0.3 cm in greatest size.  Left ovary:   -Hemorrhagic cyst with hemosiderin-laden macrophages in the wall, suggestive of endometriotic cyst.  -Ovarian follicles and luteal cyst.    OBJECTIVE:   LMP 07/24/2023 (Exact Date)   Abdomen:  ND, soft, NT  Incisions: 4 laparoscopic scars well-healed.  Pelvic:  Vaginal cuff healing well with no erythema, induration, or drainage.    ASSESSMENT:   Encounter Diagnoses   Name Primary?    Postop check Yes    Surgical menopause on hormone replacement therapy     Endometriosis          PLAN:      1) Wait until 3 months postop to start Rx FemHrt 0.5/2.5 for HRT to allow her endometriosis to atrophy further.    2)  She no longer needs Pap testing due to absence of cervix and no history of cervical cancer.      Jeffrey Bansal MD  [facility]

## 2023-09-15 NOTE — NURSING NOTE
"Chief Complaint   Patient presents with    Post-op Visit     2023 TLH- upper abdominal \"popping\" sensation after lifting daughter up        Initial /82 (BP Location: Right arm, Patient Position: Sitting, Cuff Size: Adult Regular)   Ht 1.651 m (5' 5\")   Wt 81.6 kg (180 lb)   LMP 2023 (Exact Date)   BMI 29.95 kg/m   Estimated body mass index is 29.95 kg/m  as calculated from the following:    Height as of this encounter: 1.651 m (5' 5\").    Weight as of this encounter: 81.6 kg (180 lb).  BP completed using cuff size: regular    Questioned patient about current smoking habits.  Pt. has never smoked.          The following HM Due: NONE    Chavez Shabazz CMA                "

## 2023-09-25 ENCOUNTER — TELEPHONE (OUTPATIENT)
Dept: OBGYN | Facility: CLINIC | Age: 43
End: 2023-09-25
Payer: COMMERCIAL

## 2023-09-25 NOTE — TELEPHONE ENCOUNTER
"Patient calling - please advise.    S/P [facility] with L oophorectomy and cystoscopy 8/2/23.    Patient reports itching starting Thursday night.    Patient reports last night noting a \"big black bubble\" above umbilical surgical incision.  Patient reports she thought it was a bug bite.  Patient reports \"squeezing to get a better look\" and had dime sized pus erupt from the site.  Patient does note that there was a stitch in the pus.  Patient reports it now looks scabbed and a little red.    Incisions on the right are red/purple in color.  Incisions on the left look normal to patient.    Pain is under control, no complaints.  No signs of fever.  Patient is unable to come for appointment any day this week except 9/28.      Lorin BATISTA RN    "

## 2023-09-25 NOTE — TELEPHONE ENCOUNTER
Have Pt use a Q-tip to clean the area with peroxide 3 times a day and cover with gauze.  Please make her a incision check appt with any available MD 9/28 (may be on call Gyn MD). I will be out of town.

## 2023-09-25 NOTE — TELEPHONE ENCOUNTER
Returned call to patient, advised.    Discussed recommendation for appointment, patient needs appointment after 1pm on Thursday in [place].  Scheduled with Dr. Sales for 2pm in [place].    Reviewed infection prevention and precautions.    Lorin BATISTA RN

## 2023-09-28 ENCOUNTER — OFFICE VISIT (OUTPATIENT)
Dept: OBGYN | Facility: CLINIC | Age: 43
End: 2023-09-28
Payer: COMMERCIAL

## 2023-09-28 VITALS
TEMPERATURE: 97.8 F | DIASTOLIC BLOOD PRESSURE: 80 MMHG | SYSTOLIC BLOOD PRESSURE: 128 MMHG | BODY MASS INDEX: 29.65 KG/M2 | WEIGHT: 178.2 LBS

## 2023-09-28 DIAGNOSIS — L53.9 REDNESS OF SKIN: Primary | ICD-10-CM

## 2023-09-28 PROCEDURE — 99024 POSTOP FOLLOW-UP VISIT: CPT | Performed by: STUDENT IN AN ORGANIZED HEALTH CARE EDUCATION/TRAINING PROGRAM

## 2023-09-28 NOTE — NURSING NOTE
"Chief Complaint   Patient presents with    Surgical Followup     Robot   [facility]  2023    Incision check        Initial /80 (BP Location: Left arm, Cuff Size: Adult Regular)   Temp 97.8  F (36.6  C)   Wt 80.8 kg (178 lb 3.2 oz)   LMP 2023 (Exact Date)   BMI 29.65 kg/m   Estimated body mass index is 29.65 kg/m  as calculated from the following:    Height as of 9/15/23: 1.651 m (5' 5\").    Weight as of this encounter: 80.8 kg (178 lb 3.2 oz).  BP completed using cuff size: regular    Questioned patient about current smoking habits.  Pt. currently smokes.  Advised about smoking cessation.          The following HM Due: NONE      Coty Summers, HAROLDO on 2023 at 1:59 PM         " Alert-The patient is alert, awake and responds to voice. The patient is oriented to time, place, and person. The triage nurse is able to obtain subjective information.

## 2023-09-28 NOTE — PROGRESS NOTES
"[facility]  Gynecology Office Visit    CC: c/f postop infection    S:  Charity Duarte is a 43 year old  who presents with concerns for wound infection.    She is s/p RA TLH LO on 23 with Jeffrey Bansal, uncomplicated. On Thursday noted a \"black bubble\" over the supraumbilical incision. Also bubble over the right sided incision. At first thought it might be bug bite. She pressed on the suprapubic incision and pus came out. Very concerned she has a staph infection; her daughter has had one after a jaw removal surgery. She has a had a little pain in the area. Today things feel much better. Denies F/C, abd pain, VB. Tolerating PO, eating, voiding, pass flatus. Has noted hot flashes.    She brings in pictures on her phone, that show what may be some edema or swelling over an incision, but hard to tell with poor picture quality.    O:  /80 (BP Location: Left arm, Cuff Size: Adult Regular)   Temp 97.8  F (36.6  C)   Wt 80.8 kg (178 lb 3.2 oz)   LMP 2023 (Exact Date)   BMI 29.65 kg/m      Exam:  GEN: appears well, NAD  ABD: Soft, non-tender, non-distended. Areas of erythema on abdomen at sites of tape for her bandages. 4 lsc port sites well healed. Supraumbilical port site with small <5mm area of erythema at right side of incision and what appears to be a small scab. No induration, fluctuance, pus, fluid, or underlying mass; minimally tender.    A/P:  Charity Duarte is a 43 year old  here with concerns for infection at at port site. She is s/p RA TLH LO on 23. All of the port sites are healing well on my exam. There is a small area of inflammation at the supraumbilical port site but it does not appear to be infected; favor an inflammatory response from suture material.    - Recommend continuing to cleanse area with hydrogen peroxide  - Keep incision clean and dry. No need for bandage or creams  - If erythema worsens RTC     Luis Sales MD MPH  2023 3:53 PM  "

## 2023-11-12 DIAGNOSIS — F41.9 ANXIETY: ICD-10-CM

## 2023-11-13 DIAGNOSIS — F41.9 ANXIETY: ICD-10-CM

## 2023-11-13 RX ORDER — ESCITALOPRAM OXALATE 20 MG/1
TABLET ORAL
Qty: 135 TABLET | Refills: 0 | Status: SHIPPED | OUTPATIENT
Start: 2023-11-13 | End: 2023-11-13

## 2023-11-13 NOTE — TELEPHONE ENCOUNTER
Pharmacy: Please clarify directions for escitalopram.  Take 1 1/2 tab MG (30 mg) or 20 MG (1tab)?     Disp Refills Start End QUINCY   escitalopram (LEXAPRO) 20 MG tablet 135 tablet 0 11/13/2023  No   Sig: TAKE 1 1/2 TABLET (20 MG) BY MOUTH DAILY

## 2023-11-15 RX ORDER — ESCITALOPRAM OXALATE 20 MG/1
30 TABLET ORAL DAILY
Qty: 45 TABLET | Refills: 0 | Status: SHIPPED | OUTPATIENT
Start: 2023-11-15

## 2024-01-26 ENCOUNTER — OFFICE VISIT (OUTPATIENT)
Dept: FAMILY MEDICINE | Facility: CLINIC | Age: 44
End: 2024-01-26
Payer: COMMERCIAL

## 2024-01-26 VITALS
HEIGHT: 65 IN | DIASTOLIC BLOOD PRESSURE: 77 MMHG | WEIGHT: 178.2 LBS | TEMPERATURE: 97.8 F | SYSTOLIC BLOOD PRESSURE: 120 MMHG | HEART RATE: 77 BPM | OXYGEN SATURATION: 97 % | BODY MASS INDEX: 29.69 KG/M2 | RESPIRATION RATE: 24 BRPM

## 2024-01-26 DIAGNOSIS — Z01.818 PREOP GENERAL PHYSICAL EXAM: Primary | ICD-10-CM

## 2024-01-26 DIAGNOSIS — N20.1 CALCULUS OF URETEROVESICAL JUNCTION (UVJ): ICD-10-CM

## 2024-01-26 PROCEDURE — 99214 OFFICE O/P EST MOD 30 MIN: CPT | Performed by: PHYSICIAN ASSISTANT

## 2024-01-26 RX ORDER — ALBUTEROL SULFATE 90 UG/1
2 AEROSOL, METERED RESPIRATORY (INHALATION) EVERY 6 HOURS PRN
Qty: 18 G | Refills: 0 | Status: CANCELLED | OUTPATIENT
Start: 2024-01-26

## 2024-01-26 ASSESSMENT — PATIENT HEALTH QUESTIONNAIRE - PHQ9
SUM OF ALL RESPONSES TO PHQ QUESTIONS 1-9: 17
SUM OF ALL RESPONSES TO PHQ QUESTIONS 1-9: 17
10. IF YOU CHECKED OFF ANY PROBLEMS, HOW DIFFICULT HAVE THESE PROBLEMS MADE IT FOR YOU TO DO YOUR WORK, TAKE CARE OF THINGS AT HOME, OR GET ALONG WITH OTHER PEOPLE: VERY DIFFICULT

## 2024-01-26 ASSESSMENT — PAIN SCALES - GENERAL: PAINLEVEL: SEVERE PAIN (7)

## 2024-01-26 NOTE — PROGRESS NOTES
Preoperative Evaluation  [facility]  [address] [facility] [address]  Phone: [phone]  Primary Provider: Jaime Todd  Pre-op Performing Provider: JAIME TODD  Jan 26, 2024       Charity is a 43 year old, presenting for the following:  Pre-Op Exam        1/26/2024     8:58 AM   Additional Questions   Roomed by Tania BUTT MA   Accompanied by Self         1/26/2024     8:58 AM   Patient Reported Additional Medications   Patient reports taking the following new medications Keflex 500 MG     Surgical Information  Surgery/Procedure: CYSTOSCOPY, LEFT URETEROSCOPY, LASER LITHOTRIPSY, LEFT URETERAL STENT EXCHANGE, LEFT RETROGRADE PYELOGRAM   Surgery Location: [facility]  Surgeon: Dr. Juan Carlos Petit  Surgery Date: 2/9/2024  Time of Surgery: TBD  Where patient plans to recover: At home with family  Fax number for surgical facility: Note does not need to be faxed, will be available electronically in Epic.    Assessment & Plan     The proposed surgical procedure is considered LOW risk.    Preop general physical exam  Calculus of ureterovesical junction (UVJ)   Ok for planned procedure             - No identified additional risk factors other than previously addressed        Recommendation  APPROVAL GIVEN to proceed with proposed procedure, without further diagnostic evaluation.        Subjective       HPI related to upcoming procedure: hx of uretal stones         1/26/2024     8:19 AM   Preop Questions   1. Have you ever had a heart attack or stroke? No   2. Have you ever had surgery on your heart or blood vessels, such as a stent placement, a coronary artery bypass, or surgery on an artery in your head, neck, heart, or legs? No   3. Do you have chest pain with activity? No   4. Do you have a history of  heart failure? No   5. Do you currently have a cold, bronchitis or symptoms of other infection? YES - recent pyelonephritis - on cefadroxil 1000mg  twice daily x 10 days   6. Do you have a cough, shortness of breath, or wheezing? No   7. Do you or anyone in your family have previous history of blood clots? No   8. Do you or does anyone in your family have a serious bleeding problem such as prolonged bleeding following surgeries or cuts? No   9. Have you ever had problems with anemia or been told to take iron pills? YES - with prenancies   10. Have you had any abnormal blood loss such as black, tarry or bloody stools, or abnormal vaginal bleeding? No   11. Have you ever had a blood transfusion? UNKNOWN    12. Are you willing to have a blood transfusion if it is medically needed before, during, or after your surgery? Yes   13. Have you or any of your relatives ever had problems with anesthesia? YES - slow to wake   14. Do you have sleep apnea, excessive snoring or daytime drowsiness? No   15. Do you have any artifical heart valves or other implanted medical devices like a pacemaker, defibrillator, or continuous glucose monitor? No   16. Do you have artificial joints? No   17. Are you allergic to latex? YES: rash   18. Is there any chance that you may be pregnant? No         Preoperative Review of    reviewed - recent oxycodone 5mg for nephrolithias        Patient Active Problem List    Diagnosis Date Noted    Surgical menopause on hormone replacement therapy 09/15/2023     Priority: Medium    Endometriosis 06/07/2023     Priority: Medium    Class 1 obesity due to excess calories without serious comorbidity with body mass index (BMI) of 31.0 to 31.9 in adult 06/16/2021     Priority: Medium    Nipple discharge in female 03/19/2012     Priority: Medium     3/2012:  Right sided nipple discharge.  Labs normal.  Cytology shows some blood.  US/Mammo notes small cyst, otherwise normal.  Surgeon consult obtained. Surgery will recheck in 6 months with US and appt.  Think discharge likely physiologic but if persists, she will contact Dr. Cisneros and further eval done.        Tobacco abuse 03/05/2012     Priority: Medium    Dysplasia of cervix, high grade CIERA 2 03/14/2008     Priority: Medium     8/10/06 ASCUS pap  9/4/07 LSIL, suggestive of HSIL  9/28/07 [place] - Scant fragments of unremarkable cervical mucosa  3/14/08 LEEP Bx: CIERA 2, margins neg for dysplasia  2/2/09 NIL pap  7/8/10 NIL pap  [Above per Care Everywhere]   6/21/13 NIL pap, neg HR HPV  11/21/14 NIL pap, neg HR HPV (in Care Everywhere)   6/16/21 NIL pap, neg HR HPV. Plan 3 year cotest          Past Medical History:   Diagnosis Date    Anxiety state, unspecified     Arthritis     Miscarriage     multiple in past    Motion sickness      Past Surgical History:   Procedure Laterality Date    DAVINCI HYSTERECTOMY TOTAL, BILATERAL SALPINGO-OOPHORECTOMY, COMBINED Left 8/2/2023    Procedure: ROBOT-ASSISTED TOTAL LAPAROSCOPIC HYSTERECTOMY, WITH LEFT OOPHORECTOMY, and cystoscopy;  Surgeon: Jeffrey Bansal MD;  Location: [facility] OR    DILATION AND CURETTAGE, OPERATIVE HYSTEROSCOPY WITH MORCELLATOR, COMBINED N/A 8/3/2021    Procedure: operative hysteroscopy with intrauterine morcellator, dilation and curettage;  Surgeon: Jeffrey Bansal MD;  Location: [facility] OR    DILATION AND CURETTAGE, OPERATIVE HYSTEROSCOPY, COMBINED  08/03/2021    Laparoscopic right salpingo-oophorectomy, Left salpingectomy, Hysteroscopic endometrial polypectomy with intrauterine morcellator, Dilatation and curettage - Path right ovarian endometrioma, normal tubes, benign endometrial polyp, benign endometrium    ENT SURGERY  2009    wisdom tooth extraction    LAPAROSCOPIC SALPINGECTOMY Left 08/03/2021    Laparoscopic right salpingo-oophorectomy, Left salpingectomy, Hysteroscopic endometrial polypectomy with intrauterine morcellator, Dilatation and curettage - Path right ovarian endometrioma, normal tubes, benign endometrial polyp, benign endometrium    LAPAROSCOPIC SALPINGO-OOPHORECTOMY Right 08/03/2021    Laparoscopic right salpingo-oophorectomy, Left salpingectomy,  Hysteroscopic endometrial polypectomy with intrauterine morcellator, Dilatation and curettage - Path right ovarian endometrioma, normal tubes, benign endometrial polyp, benign endometrium    LAPAROSCOPIC SALPINGO-OOPHORECTOMY Bilateral 8/3/2021    Procedure: Laparoscopic bilateral salpingectomy, right salpingo oophorectomy;  Surgeon: Jeffrey Bansal MD;  Location:  OR    [facility] STABISM SURG,PREV EYE SURG,NOT MUSC      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     Current Outpatient Medications   Medication Sig Dispense Refill    Acetaminophen (TYLENOL PO) Take 1,000 mg by mouth daily as needed (Patient not taking: Reported on 9/28/2023)      albuterol (PROAIR HFA/PROVENTIL HFA/VENTOLIN HFA) 108 (90 Base) MCG/ACT inhaler Inhale 2 puffs into the lungs every 6 hours as needed for shortness of breath or wheezing 18 g 0    busPIRone (BUSPAR) 15 MG tablet Take 1 tablet (15 mg) by mouth 2 times daily 180 tablet 0    escitalopram (LEXAPRO) 20 MG tablet Take 1.5 tablets (30 mg) by mouth daily 45 tablet 0    ibuprofen (ADVIL/MOTRIN) 600 MG tablet Take 1 tablet (600 mg) by mouth every 6 hours as needed for mild pain or moderate pain Take w/ food 30 tablet 0    loratadine (CLARITIN) 10 MG tablet Take 10 mg by mouth daily      norethindrone-eth estradiol (FEMHRT LOW DOSE) 0.5-2.5 MG-MCG tablet Take 1 tablet by mouth At Bedtime (Patient not taking: Reported on 9/28/2023) 90 tablet 3    ondansetron (ZOFRAN) 4 MG tablet Take 1 tablet (4 mg) by mouth every 8 hours as needed for nausea or vomiting (Patient not taking: Reported on 9/28/2023) 12 tablet 0    oxyCODONE (ROXICODONE) 5 MG tablet Take 1 tablet (5 mg) by mouth every 6 hours as needed for moderate to severe pain (Patient not taking: Reported on 9/15/2023) 10 tablet 0       Allergies   Allergen Reactions    Bees Anaphylaxis    Marijuana [Cannabis] Anaphylaxis    Latex Swelling and Rash    Mold Cough    Pollen Extract Cough    Seasonal Allergies Cough     Sneezing, Coughing.        Social History  "    Tobacco Use    Smoking status: Every Day     Packs/day: .5     Types: Cigarettes     Passive exposure: Current    Smokeless tobacco: Never    Tobacco comments:     amount varies to stress   Substance Use Topics    Alcohol use: Not Currently     Comment: Rare. Special occasion.  last drink was 2 years ago.     History   Drug Use No         Review of Systems    Review of Systems  Constitutional, HEENT, cardiovascular, pulmonary, gi and gu systems are negative, except as otherwise noted.  Objective    LMP 07/24/2023 (Exact Date)    Estimated body mass index is 29.65 kg/m  as calculated from the following:    Height as of 9/15/23: 1.651 m (5' 5\").    Weight as of 9/28/23: 80.8 kg (178 lb 3.2 oz).    Physical Exam  GENERAL: alert and no distress  EYES: Eyes grossly normal to inspection, PERRL and conjunctivae and sclerae normal  HENT: ear canals and TM's normal, nose and mouth without ulcers or lesions  RESP: lungs clear to auscultation - no rales, rhonchi or wheezes  CV: regular rate and rhythm, normal S1 S2, no S3 or S4, no murmur, click or rub, no peripheral edema  ABDOMEN: soft, nontender, no hepatosplenomegaly, no masses and bowel sounds normal  BACK: mild bilateral CVA tenderness    Recent Labs     omponent  Ref Range & Units 4 d ago   SODIUM  136 - 145 mmol/L 143   POTASSIUM  3.5 - 5.1 mmol/L 4.1   CHLORIDE  98 - 107 mmol/L 106   CO2,TOTAL  22 - 29 mmol/L 26   ANION GAP  5 - 18 11   GLUCOSE  70 - 99 mg/dL 96   CALCIUM  8.6 - 10.0 mg/dL 9.5   BUN  6 - 20 mg/dL 13   CREATININE  0.50 - 0.90 mg/dL 0.81   BUN/CREAT RATIO  10 - 20 16   eGFR  >90 mL/min/1.73m2 >90     WHITE BLOOD COUNT          4.5 - 11.0 thou/cu mm 10.7   RED BLOOD COUNT            4.00 - 5.20 mil/cu mm 4.60   HEMOGLOBIN                12.0 - 16.0 g/dL 14.1   HEMATOCRIT                33.0 - 51.0 % 41.4   MCV                        80 - 100 fL 90   MCH                        26.0 - 34.0 pg 30.7   MCHC                      32.0 - 36.0 g/dL 34.1   RDW "                        11.5 - 15.5 % 13.2   PLATELET COUNT            140 - 440 thou/cu mm 372   MPV                        6.5 - 11.0 fL 11.6 High    NRBC                      % 0.0   ABS NRBC  thou /cu mm 0.0   % NEUT  % 73.0   % LYMPH  % 17.1   % MONO  % 6.6   % EOS  % 2.0   % BASO  % 1.0   % IMMATURE GRAN (METAS,MYELOS,PROS)  % 0.3   ABSOLUTE NEUTROPHILS      1.7 - 7.0 thou/cu mm 7.8 High    ABSOLUTE LYMPHOCYTES      0.9 - 2.9 thou/cu mm 1.8   ABSOLUTE MONOCYTES        <0.9 thou/cu mm 0.7   ABSOLUTE EOSINOPHILS      <0.5 thou/cu mm 0.2   ABSOLUTE BASOPHILS        <0.3 thou/cu mm 0.1   ABSOLUTE IMMATURE GRANULOCYTES(METAS,MYELOS,PROS)  <0.3 thou/cu mm 0.0         Diagnostics  Lab recently done in ED 1/22 (above)   No EKG required, no history of coronary heart disease, significant arrhythmia, peripheral arterial disease or other structural heart disease.    Revised Cardiac Risk Index (RCRI)  The patient has the following serious cardiovascular risks for perioperative complications:   - No serious cardiac risks = 0 points     RCRI Interpretation: 0 points: Class I (very low risk - 0.4% complication rate)         Signed Electronically by: Jaime Sanchez PA-C  Copy of this evaluation report is provided to requesting physician.         Answers submitted by the patient for this visit:  Patient Health Questionnaire (Submitted on 1/26/2024)  If you checked off any problems, how difficult have these problems made it for you to do your work, take care of things at home, or get along with other people?: Very difficult  PHQ9 TOTAL SCORE: 17

## 2024-01-26 NOTE — PATIENT INSTRUCTIONS
Preparing for Your Surgery  Getting started  A nurse will call you to review your health history and instructions. They will give you an arrival time based on your scheduled surgery time. Please be ready to share:  Your doctor's clinic name and phone number  Your medical, surgical, and anesthesia history  A list of allergies and sensitivities  A list of medicines, including herbal treatments and over-the-counter drugs  Whether the patient has a legal guardian (ask how to send us the papers in advance)  Please tell us if you're pregnant--or if there's any chance you might be pregnant. Some surgeries may injure a fetus (unborn baby), so they require a pregnancy test. Surgeries that are safe for a fetus don't always need a test, and you can choose whether to have one.   If you have a child who's having surgery, please ask for a copy of Preparing for Your Child's Surgery.    Preparing for surgery  Within 10 to 30 days of surgery: Have a pre-op exam (sometimes called an H&P, or History and Physical). This can be done at a clinic or pre-operative center.  If you're having a , you may not need this exam. Talk to your care team.  At your pre-op exam, talk to your care team about all medicines you take. If you need to stop any medicines before surgery, ask when to start taking them again.  We do this for your safety. Many medicines can make you bleed too much during surgery. Some change how well surgery (anesthesia) drugs work.  Call your insurance company to let them know you're having surgery. (If you don't have insurance, call [phone].)  Call your clinic if there's any change in your health. This includes signs of a cold or flu (sore throat, runny nose, cough, rash, fever). It also includes a scrape or scratch near the surgery site.  If you have questions on the day of surgery, call your hospital or surgery center.  Eating and drinking guidelines  For your safety: Unless your surgeon tells you otherwise,  follow the guidelines below.  Eat and drink as usual until 8 hours before you arrive for surgery. After that, no food or milk.  Drink clear liquids until 2 hours before you arrive. These are liquids you can see through, like water, Gatorade, and Propel Water. They also include plain black coffee and tea (no cream or milk), candy, and breath mints. You can spit out gum when you arrive.  If you drink alcohol: Stop drinking it the night before surgery.  If your care team tells you to take medicine on the morning of surgery, it's okay to take it with a sip of water.  Preventing infection  Shower or bathe the night before and morning of your surgery. Follow the instructions your clinic gave you. (If no instructions, use regular soap.)  Don't shave or clip hair near your surgery site. We'll remove the hair if needed.  Don't smoke or vape the morning of surgery. You may chew nicotine gum up to 2 hours before surgery. A nicotine patch is okay.  Note: Some surgeries require you to completely quit smoking and nicotine. Check with your surgeon.  Your care team will make every effort to keep you safe from infection. We will:  Clean our hands often with soap and water (or an alcohol-based hand rub).  Clean the skin at your surgery site with a special soap that kills germs.  Give you a special gown to keep you warm. (Cold raises the risk of infection.)  Wear special hair covers, masks, gowns and gloves during surgery.  Give antibiotic medicine, if prescribed. Not all surgeries need antibiotics.  What to bring on the day of surgery  Photo ID and insurance card  Copy of your health care directive, if you have one  Glasses and hearing aids (bring cases)  You can't wear contacts during surgery  Inhaler and eye drops, if you use them (tell us about these when you arrive)  CPAP machine or breathing device, if you use them  A few personal items, if spending the night  If you have . . .  A pacemaker, ICD (cardiac defibrillator) or other  implant: Bring the ID card.  An implanted stimulator: Bring the remote control.  A legal guardian: Bring a copy of the certified (court-stamped) guardianship papers.  Please remove any jewelry, including body piercings. Leave jewelry and other valuables at home.  If you're going home the day of surgery  You must have a responsible adult drive you home. They should stay with you overnight as well.  If you don't have someone to stay with you, and you aren't safe to go home alone, we may keep you overnight. Insurance often won't pay for this.  After surgery  If it's hard to control your pain or you need more pain medicine, please call your surgeon's office.  Questions?   If you have any questions for your care team, list them here: _________________________________________________________________________________________________________________________________________________________________________ ____________________________________ ____________________________________ ____________________________________  For informational purposes only. Not to replace the advice of your health care provider. Copyright   2003, 2019 [employer] [facility]. All rights reserved. Clinically reviewed by Pooja Winter MD. [employer]ART[employer] 122127 - REV 12/22.    How to Take Your Medication Before Surgery  - Take all of your medications before surgery as usual  - No ibuprofen, aspirin, advil or aleve ; Tylenol is ok for pain  - starting one week out from surgery (tylenol 1000mg three times day)

## 2024-01-26 NOTE — COMMUNITY RESOURCES LIST (ENGLISH)
01/26/2024   [facility]  N/A  For questions about this resource list or additional care needs, please contact your primary care clinic or care manager.  Phone: [phone]   Email: N/A   Address: [address] [place] [id number]   Hours: N/A        Food and Nutrition       Food pantry  1  [place] [employer] Distance: 15.38 miles      Pickup   [address] [place] [address]  Language: English  Hours: Tue 1:00 PM - 3:00 PM , Thu 9:00 AM - 11:00 AM , Sat 9:00 AM - 11:00 AM  Fees: Free   Phone: (414) 384-7954 Email: [email or web address] Website: [email or web address]     2  [place] [employer] Distance: 17.04 miles      [facility]   [address] [place] [address]  Language: English, Haitian  Hours: Thu 10:00 AM - 2:00 PM , Thu 3:00 PM - 7:00 PM  Fees: Free   Phone: (331) 769-5174 Email: pcfoodpantry@[employer] Website: [email or web address]://[email or web address]/[facility]-food-pantry     SNAP application assistance  3  [facility] - [facility] [place]: 21.39 miles      In-Person, Phone/Virtual   [address] [place] [address]  Language: English, Hmong, Cameroonian, Haitian  Hours: Mon - Fri 8:00 AM - 4:30 PM  Fees: Free   Phone: (518) 513-3119 Website: https://www.workforceresource.org/     Soup kitchen or free meals  4  [facility] Distance: 19.98 miles      In-Person   [address] [place] [address]  Language: English  Hours: Wed 6:00 PM - 6:30 PM  Fees: Free   Phone: (346) 619-1907 Email: office@[facility].org Website: [email or web address].org/     5  [facility] Distance: 20.7 miles      In-Person   [address] [place] [address]  Language: English  Hours: Sun 12:00 PM - 1:30 PM  Fees: Free   Phone: (142) 187-8277 Email: gisele@[employer].Nitch Website: https://[email or web address]/[place]/          Important Numbers & Websites       Emergency  Services   911  [facility]   311  Poison Control   (147) 210-4140  Suicide Prevention Lifeline   (516) 164-1078 (TALK)  Child Abuse Hotline   (479) 727-8625 (4-A-Child)  Sexual Assault Hotline   (966) 651-2960 (HOPE)  National Runaway Safeline   (273) 342-2186 (RUNAWAY)  All-Options Talkline   (190) 742-6116  Substance Abuse Referral   (244) 512-5671 (HELP)

## 2024-02-20 ENCOUNTER — TRANSFERRED RECORDS (OUTPATIENT)
Dept: HEALTH INFORMATION MANAGEMENT | Facility: CLINIC | Age: 44
End: 2024-02-20
Payer: COMMERCIAL

## 2024-06-14 ENCOUNTER — TRANSFERRED RECORDS (OUTPATIENT)
Dept: HEALTH INFORMATION MANAGEMENT | Facility: CLINIC | Age: 44
End: 2024-06-14
Payer: COMMERCIAL

## 2024-09-03 ENCOUNTER — TELEPHONE (OUTPATIENT)
Dept: FAMILY MEDICINE | Facility: CLINIC | Age: 44
End: 2024-09-03
Payer: COMMERCIAL

## 2024-09-03 NOTE — TELEPHONE ENCOUNTER
Forms/Letter Request    Type of form/letter: Medical opinion      Do we have the form/letter: Yes:     Who is the form from? [place] Department of Human Services     Where did/will the form come from? form was faxed in    How would you like the form/letter returned: [phone]     Jane Isabellafrancis Patient Rep.

## 2024-09-05 NOTE — TELEPHONE ENCOUNTER
In the past several years I have only seen this patient for pre-operative exams and do not feel I can reasonably complete this form. Ill leave it in the OB. Please let [place] Dept of Human services listed on the form know this information   Carac Counseling:  I discussed with the patient the risks of Carac including but not limited to erythema, scaling, itching, weeping, crusting, and pain.

## 2024-09-05 NOTE — TELEPHONE ENCOUNTER
Reached out tot he [place] depart. Of Human Services and LVM letting him know that the Provider has only see the Pt once and does not feel it is appropriate for him to fill out the form. Left the clinic number just in case if they may have questions.     Brittany Kumari   [facility]

## 2024-10-31 ENCOUNTER — TRANSFERRED RECORDS (OUTPATIENT)
Dept: HEALTH INFORMATION MANAGEMENT | Facility: CLINIC | Age: 44
End: 2024-10-31
Payer: COMMERCIAL

## 2025-02-13 ENCOUNTER — TELEPHONE (OUTPATIENT)
Dept: FAMILY MEDICINE | Facility: CLINIC | Age: 45
End: 2025-02-13

## 2025-03-13 ENCOUNTER — TRANSFERRED RECORDS (OUTPATIENT)
Dept: HEALTH INFORMATION MANAGEMENT | Facility: CLINIC | Age: 45
End: 2025-03-13
Payer: COMMERCIAL

## 2025-06-10 ENCOUNTER — TRANSFERRED RECORDS (OUTPATIENT)
Dept: HEALTH INFORMATION MANAGEMENT | Facility: CLINIC | Age: 45
End: 2025-06-10
Payer: COMMERCIAL

## (undated) DEVICE — BAG CLEAR TRASH 1.3M 39X33" P4040C

## (undated) DEVICE — DAVINCI XI DRAPE COLUMN 470341

## (undated) DEVICE — ENDO POUCH UNIV RETRIEVAL SYSTEM INZII 10MM CD001

## (undated) DEVICE — SOL NACL 0.9% IRRIG 1000ML BOTTLE 2F7124

## (undated) DEVICE — DAVINCI XI TISSUE SEALER SYNCROSEAL 8MM 480440

## (undated) DEVICE — SUCTION MANIFOLD NEPTUNE 2 SYS 4 PORT 0702-020-000

## (undated) DEVICE — SEAL SET MYOSURE ROD LENS SCOPE SINGLE USE 40-902

## (undated) DEVICE — TUBING IRRIG TUR Y TYPE 96" LF 6543-01

## (undated) DEVICE — DAVINCI XI DRAPE ARM 470015

## (undated) DEVICE — SU WND CLOSURE VLOC 180 ABS 2-0 9" GS-22 VLOCL2145

## (undated) DEVICE — SU VICRYL 4-0 PS-2 18" UND J496H

## (undated) DEVICE — DAVINCI HOT SHEARS TIP COVER  400180

## (undated) DEVICE — PREP POVIDONE IODINE SOLUTION 10% 4OZ BOTTLE 29906-004

## (undated) DEVICE — PAD CHUX UNDERPAD 30X36" P3036C

## (undated) DEVICE — ENDO TROCAR FIRST ENTRY KII FIOS ADV FIX 05X100MM CFF03

## (undated) DEVICE — PREP DURAPREP 26ML APL 8630

## (undated) DEVICE — SOL NACL 0.9% INJ 1000ML BAG 2B1324X

## (undated) DEVICE — Device

## (undated) DEVICE — DRAPE UNDER BUTTOCK 89415

## (undated) DEVICE — ENDO TROCAR FIRST ENTRY KII FIOS ADV FIX 11X100MM CFF33

## (undated) DEVICE — PACK GYN LAPAROSCOPY RIDGES

## (undated) DEVICE — PREP SKIN SCRUB TRAY 4461A

## (undated) DEVICE — GLOVE BIOGEL PI MICRO INDICATOR UNDERGLOVE SZ 6.0 48960

## (undated) DEVICE — GLOVE PROTEXIS BLUE W/NEU-THERA 6.5  2D73EB65

## (undated) DEVICE — GLOVE BIOGEL PI MICRO INDICATOR UNDERGLOVE SZ 6.5 48965

## (undated) DEVICE — CATH FOLEY 14FR 5ML SILICONE LUBRI-SIL 175814

## (undated) DEVICE — DRSG STERI STRIP 1/2X4" R1547

## (undated) DEVICE — LINEN ORTHO ACL PACK 5447

## (undated) DEVICE — BASIN SET MINOR DISP

## (undated) DEVICE — PREP POVIDONE-IODINE 7.5% SCRUB 4OZ BOTTLE MDS093945

## (undated) DEVICE — DEVICE SUTURE PASSER 14GA WECK EFX EFXSP2

## (undated) DEVICE — ENDO TROCAR CONMED AIRSEAL BLADELESS 05X120MM IAS5-120LP

## (undated) DEVICE — RETR ELEV / UTERINE MANIPULATOR V-CARE XLG CUP 60-6085-203A

## (undated) DEVICE — DRAPE MAYO STAND 23X54 8337

## (undated) DEVICE — DRSG TELFA 3X8" 1238

## (undated) DEVICE — SU MONOCRYL 4-0 PS-2 27" UND Y426H

## (undated) DEVICE — SOL NACL 0.9% IRRIG 3000ML BAG 2B7477

## (undated) DEVICE — TUBING CONMED AIRSEAL SMOKE EVAC INSUFFLATION ASM-EVAC

## (undated) DEVICE — PREP CHLORHEXIDINE 4% 4OZ (HIBICLENS) 57504

## (undated) DEVICE — SU VICRYL 0 CT-1 27" J340H

## (undated) DEVICE — LINEN POUCH DBL 5427

## (undated) DEVICE — PREP LOTION REMOVER 0.5OZ 8610

## (undated) DEVICE — DAVINCI XI OBTURATOR BLADELESS 8MM 470359

## (undated) DEVICE — LUBRICANT INST ELECTROLUBE EL101

## (undated) DEVICE — NDL INSUFFLATION 13GA 120MM C2201

## (undated) DEVICE — KIT PROCEDURE FLUENT IN/OUT FLOWPAK TISS TRAP FLT-112S

## (undated) DEVICE — SUCTION CANISTER MEDIVAC LINER 3000ML W/LID 65651-530

## (undated) DEVICE — DAVINCI XI SEAL UNIVERSAL 5-8MM 470361

## (undated) DEVICE — KIT PATIENT POSITIONING PIGAZZI LATEX FREE 40580

## (undated) DEVICE — SUCTION IRR STRYKERFLOW II W/TIP 250-070-520

## (undated) DEVICE — ESU HANDPIECE THUNDERBEAT FRONT ACT 5MMX35CM TB-0535FCS

## (undated) DEVICE — ESU GROUND PAD ADULT W/CORD E7507

## (undated) DEVICE — PROTECTOR ARM ONE-STEP TRENDELENBURG 40418

## (undated) DEVICE — CATH TRAY FOLEY SURESTEP 16FR DRAIN BAG STATOCK A899916

## (undated) DEVICE — SYR 10ML LL W/O NDL 302995

## (undated) DEVICE — LINEN HALF SHEET 5512

## (undated) DEVICE — LINEN DRAPE 54X72" 5467

## (undated) DEVICE — LINEN FULL SHEET 5511

## (undated) DEVICE — LINEN TOWEL PACK X10 5473

## (undated) DEVICE — SOL WATER IRRIG 1000ML BOTTLE 2F7114

## (undated) DEVICE — SU PDS II 0 CT 36" Z358T

## (undated) DEVICE — PACK DAVINCI GYN SMA15GDFS1

## (undated) DEVICE — SOL NACL 0.9% IRRIG 3000ML BAG 07972-08

## (undated) DEVICE — PACK MINOR LITHOTOMY RIDGES

## (undated) DEVICE — ENDO TROCAR SLEEVE KII ADV FIXATION 05X100MM CFS02

## (undated) RX ORDER — PROPOFOL 10 MG/ML
INJECTION, EMULSION INTRAVENOUS
Status: DISPENSED
Start: 2023-08-02

## (undated) RX ORDER — PROPOFOL 10 MG/ML
INJECTION, EMULSION INTRAVENOUS
Status: DISPENSED
Start: 2021-08-03

## (undated) RX ORDER — DEXAMETHASONE SODIUM PHOSPHATE 4 MG/ML
INJECTION, SOLUTION INTRA-ARTICULAR; INTRALESIONAL; INTRAMUSCULAR; INTRAVENOUS; SOFT TISSUE
Status: DISPENSED
Start: 2023-08-02

## (undated) RX ORDER — HYDROMORPHONE HCL IN WATER/PF 6 MG/30 ML
PATIENT CONTROLLED ANALGESIA SYRINGE INTRAVENOUS
Status: DISPENSED
Start: 2021-08-03

## (undated) RX ORDER — ONDANSETRON 4 MG/1
TABLET, ORALLY DISINTEGRATING ORAL
Status: DISPENSED
Start: 2023-08-02

## (undated) RX ORDER — ONDANSETRON 2 MG/ML
INJECTION INTRAMUSCULAR; INTRAVENOUS
Status: DISPENSED
Start: 2023-08-02

## (undated) RX ORDER — FENTANYL CITRATE-0.9 % NACL/PF 10 MCG/ML
PLASTIC BAG, INJECTION (ML) INTRAVENOUS
Status: DISPENSED
Start: 2023-08-02

## (undated) RX ORDER — NEOSTIGMINE METHYLSULFATE 1 MG/ML
VIAL (ML) INJECTION
Status: DISPENSED
Start: 2023-08-02

## (undated) RX ORDER — FENTANYL CITRATE 50 UG/ML
INJECTION, SOLUTION INTRAMUSCULAR; INTRAVENOUS
Status: DISPENSED
Start: 2023-08-02

## (undated) RX ORDER — LIDOCAINE HYDROCHLORIDE 10 MG/ML
INJECTION, SOLUTION EPIDURAL; INFILTRATION; INTRACAUDAL; PERINEURAL
Status: DISPENSED
Start: 2021-08-03

## (undated) RX ORDER — DEXAMETHASONE SODIUM PHOSPHATE 4 MG/ML
INJECTION, SOLUTION INTRA-ARTICULAR; INTRALESIONAL; INTRAMUSCULAR; INTRAVENOUS; SOFT TISSUE
Status: DISPENSED
Start: 2021-08-03

## (undated) RX ORDER — SCOLOPAMINE TRANSDERMAL SYSTEM 1 MG/1
PATCH, EXTENDED RELEASE TRANSDERMAL
Status: DISPENSED
Start: 2023-08-02

## (undated) RX ORDER — ONDANSETRON 2 MG/ML
INJECTION INTRAMUSCULAR; INTRAVENOUS
Status: DISPENSED
Start: 2021-08-03

## (undated) RX ORDER — LIDOCAINE HYDROCHLORIDE 10 MG/ML
INJECTION, SOLUTION EPIDURAL; INFILTRATION; INTRACAUDAL; PERINEURAL
Status: DISPENSED
Start: 2023-08-02

## (undated) RX ORDER — FENTANYL CITRATE 50 UG/ML
INJECTION, SOLUTION INTRAMUSCULAR; INTRAVENOUS
Status: DISPENSED
Start: 2021-08-03

## (undated) RX ORDER — CEFAZOLIN SODIUM/WATER 2 G/20 ML
SYRINGE (ML) INTRAVENOUS
Status: DISPENSED
Start: 2023-08-02

## (undated) RX ORDER — OXYCODONE HYDROCHLORIDE 5 MG/1
TABLET ORAL
Status: DISPENSED
Start: 2023-08-02

## (undated) RX ORDER — OXYCODONE HYDROCHLORIDE 5 MG/1
TABLET ORAL
Status: DISPENSED
Start: 2021-08-03

## (undated) RX ORDER — GLYCOPYRROLATE 0.2 MG/ML
INJECTION, SOLUTION INTRAMUSCULAR; INTRAVENOUS
Status: DISPENSED
Start: 2023-08-02

## (undated) RX ORDER — ACETAMINOPHEN 325 MG/1
TABLET ORAL
Status: DISPENSED
Start: 2023-08-02

## (undated) RX ORDER — BUPIVACAINE HYDROCHLORIDE 5 MG/ML
INJECTION, SOLUTION EPIDURAL; INTRACAUDAL
Status: DISPENSED
Start: 2021-08-03

## (undated) RX ORDER — PHENAZOPYRIDINE HYDROCHLORIDE 200 MG/1
TABLET, FILM COATED ORAL
Status: DISPENSED
Start: 2023-08-02

## (undated) RX ORDER — ACETAMINOPHEN 325 MG/1
TABLET ORAL
Status: DISPENSED
Start: 2021-08-03

## (undated) RX ORDER — CEFAZOLIN SODIUM 2 G/100ML
INJECTION, SOLUTION INTRAVENOUS
Status: DISPENSED
Start: 2021-08-03

## (undated) RX ORDER — BUPIVACAINE HYDROCHLORIDE 5 MG/ML
INJECTION, SOLUTION EPIDURAL; INTRACAUDAL
Status: DISPENSED
Start: 2023-08-02

## (undated) RX ORDER — SCOLOPAMINE TRANSDERMAL SYSTEM 1 MG/1
PATCH, EXTENDED RELEASE TRANSDERMAL
Status: DISPENSED
Start: 2021-08-03

## (undated) RX ORDER — GLYCOPYRROLATE 0.2 MG/ML
INJECTION INTRAMUSCULAR; INTRAVENOUS
Status: DISPENSED
Start: 2021-08-03

## (undated) RX ORDER — KETOROLAC TROMETHAMINE 15 MG/ML
INJECTION, SOLUTION INTRAMUSCULAR; INTRAVENOUS
Status: DISPENSED
Start: 2021-08-03

## (undated) RX ORDER — KETOROLAC TROMETHAMINE 30 MG/ML
INJECTION, SOLUTION INTRAMUSCULAR; INTRAVENOUS
Status: DISPENSED
Start: 2021-08-03

## (undated) RX ORDER — KETOROLAC TROMETHAMINE 30 MG/ML
INJECTION, SOLUTION INTRAMUSCULAR; INTRAVENOUS
Status: DISPENSED
Start: 2023-08-02

## (undated) RX ORDER — NEOSTIGMINE METHYLSULFATE 1 MG/ML
VIAL (ML) INJECTION
Status: DISPENSED
Start: 2021-08-03